# Patient Record
Sex: FEMALE | Race: WHITE | NOT HISPANIC OR LATINO | Employment: OTHER | URBAN - NONMETROPOLITAN AREA
[De-identification: names, ages, dates, MRNs, and addresses within clinical notes are randomized per-mention and may not be internally consistent; named-entity substitution may affect disease eponyms.]

---

## 2018-04-05 ENCOUNTER — OFFICE VISIT CONVERTED (OUTPATIENT)
Dept: FAMILY MEDICINE CLINIC | Age: 73
End: 2018-04-05
Attending: NURSE PRACTITIONER

## 2018-04-18 ENCOUNTER — OFFICE VISIT CONVERTED (OUTPATIENT)
Dept: ORTHOPEDIC SURGERY | Facility: CLINIC | Age: 73
End: 2018-04-18
Attending: ORTHOPAEDIC SURGERY

## 2018-07-23 ENCOUNTER — OFFICE VISIT CONVERTED (OUTPATIENT)
Dept: ORTHOPEDIC SURGERY | Facility: CLINIC | Age: 73
End: 2018-07-23
Attending: PHYSICIAN ASSISTANT

## 2018-08-20 ENCOUNTER — OFFICE VISIT CONVERTED (OUTPATIENT)
Dept: ORTHOPEDIC SURGERY | Facility: CLINIC | Age: 73
End: 2018-08-20
Attending: PHYSICIAN ASSISTANT

## 2018-08-21 ENCOUNTER — OFFICE VISIT CONVERTED (OUTPATIENT)
Dept: FAMILY MEDICINE CLINIC | Age: 73
End: 2018-08-21
Attending: FAMILY MEDICINE

## 2018-10-08 ENCOUNTER — OFFICE VISIT CONVERTED (OUTPATIENT)
Dept: ORTHOPEDIC SURGERY | Facility: CLINIC | Age: 73
End: 2018-10-08
Attending: PHYSICIAN ASSISTANT

## 2019-02-22 ENCOUNTER — OFFICE VISIT CONVERTED (OUTPATIENT)
Dept: FAMILY MEDICINE CLINIC | Age: 74
End: 2019-02-22
Attending: FAMILY MEDICINE

## 2019-02-22 ENCOUNTER — HOSPITAL ENCOUNTER (OUTPATIENT)
Dept: OTHER | Facility: HOSPITAL | Age: 74
Discharge: HOME | End: 2019-02-22
Attending: FAMILY MEDICINE

## 2019-02-22 LAB
ALBUMIN SERPL-MCNC: 3.9 G/DL (ref 3.5–5)
ALBUMIN/GLOB SERPL: 1.2 {RATIO} (ref 1.4–2.6)
ALP SERPL-CCNC: 77 U/L (ref 43–160)
ALT SERPL-CCNC: 10 U/L (ref 10–40)
ANION GAP SERPL CALC-SCNC: 16 MMOL/L (ref 8–19)
AST SERPL-CCNC: 14 U/L (ref 15–50)
BILIRUB SERPL-MCNC: 0.17 MG/DL (ref 0.2–1.3)
BUN SERPL-MCNC: 15 MG/DL (ref 5–25)
BUN/CREAT SERPL: 20 {RATIO} (ref 6–20)
CALCIUM SERPL-MCNC: 9.3 MG/DL (ref 8.7–10.4)
CHLORIDE SERPL-SCNC: 103 MMOL/L (ref 99–111)
CHOLEST SERPL-MCNC: 172 MG/DL (ref 107–200)
CHOLEST/HDLC SERPL: 2.8 {RATIO} (ref 3–6)
CONV CO2: 29 MMOL/L (ref 22–32)
CONV TOTAL PROTEIN: 7.1 G/DL (ref 6.3–8.2)
CREAT UR-MCNC: 0.74 MG/DL (ref 0.5–0.9)
GFR SERPLBLD BASED ON 1.73 SQ M-ARVRAT: >60 ML/MIN/{1.73_M2}
GLOBULIN UR ELPH-MCNC: 3.2 G/DL (ref 2–3.5)
GLUCOSE SERPL-MCNC: 101 MG/DL (ref 65–99)
HDLC SERPL-MCNC: 62 MG/DL (ref 40–60)
LDLC SERPL CALC-MCNC: 77 MG/DL (ref 70–100)
OSMOLALITY SERPL CALC.SUM OF ELEC: 299 MOSM/KG (ref 273–304)
POTASSIUM SERPL-SCNC: 3.5 MMOL/L (ref 3.5–5.3)
SODIUM SERPL-SCNC: 144 MMOL/L (ref 135–147)
TRIGL SERPL-MCNC: 164 MG/DL (ref 40–150)
TSH SERPL-ACNC: 0.66 M[IU]/L (ref 0.27–4.2)
VLDLC SERPL-MCNC: 33 MG/DL (ref 5–37)

## 2019-03-29 ENCOUNTER — HOSPITAL ENCOUNTER (OUTPATIENT)
Dept: URGENT CARE | Facility: CLINIC | Age: 74
Discharge: HOME | End: 2019-03-29
Attending: FAMILY MEDICINE

## 2019-04-01 LAB — BACTERIA SPEC AEROBE CULT: NORMAL

## 2019-06-18 ENCOUNTER — HOSPITAL ENCOUNTER (OUTPATIENT)
Dept: OTHER | Facility: HOSPITAL | Age: 74
Discharge: HOME | End: 2019-06-18
Attending: FAMILY MEDICINE

## 2019-08-29 ENCOUNTER — OFFICE VISIT CONVERTED (OUTPATIENT)
Dept: FAMILY MEDICINE CLINIC | Age: 74
End: 2019-08-29
Attending: FAMILY MEDICINE

## 2019-09-03 ENCOUNTER — HOSPITAL ENCOUNTER (OUTPATIENT)
Dept: GENERAL RADIOLOGY | Facility: HOSPITAL | Age: 74
Discharge: HOME | End: 2019-09-03
Attending: FAMILY MEDICINE

## 2019-09-03 LAB
ALBUMIN SERPL-MCNC: 4.2 G/DL (ref 3.5–5)
ALBUMIN/GLOB SERPL: 1.4 {RATIO} (ref 1.4–2.6)
ALP SERPL-CCNC: 76 U/L (ref 43–160)
ALT SERPL-CCNC: 10 U/L (ref 10–40)
ANION GAP SERPL CALC-SCNC: 18 MMOL/L (ref 8–19)
AST SERPL-CCNC: 16 U/L (ref 15–50)
BILIRUB SERPL-MCNC: 0.49 MG/DL (ref 0.2–1.3)
BUN SERPL-MCNC: 15 MG/DL (ref 5–25)
BUN/CREAT SERPL: 18 {RATIO} (ref 6–20)
CALCIUM SERPL-MCNC: 9.8 MG/DL (ref 8.7–10.4)
CHLORIDE SERPL-SCNC: 104 MMOL/L (ref 99–111)
CHOLEST SERPL-MCNC: 170 MG/DL (ref 107–200)
CHOLEST/HDLC SERPL: 2.6 {RATIO} (ref 3–6)
CONV CO2: 25 MMOL/L (ref 22–32)
CONV TOTAL PROTEIN: 7.2 G/DL (ref 6.3–8.2)
CREAT UR-MCNC: 0.82 MG/DL (ref 0.5–0.9)
GFR SERPLBLD BASED ON 1.73 SQ M-ARVRAT: >60 ML/MIN/{1.73_M2}
GLOBULIN UR ELPH-MCNC: 3 G/DL (ref 2–3.5)
GLUCOSE SERPL-MCNC: 98 MG/DL (ref 65–99)
HDLC SERPL-MCNC: 66 MG/DL (ref 40–60)
LDLC SERPL CALC-MCNC: 85 MG/DL (ref 70–100)
OSMOLALITY SERPL CALC.SUM OF ELEC: 297 MOSM/KG (ref 273–304)
POTASSIUM SERPL-SCNC: 3.8 MMOL/L (ref 3.5–5.3)
SODIUM SERPL-SCNC: 143 MMOL/L (ref 135–147)
TRIGL SERPL-MCNC: 94 MG/DL (ref 40–150)
TSH SERPL-ACNC: 1.05 M[IU]/L (ref 0.27–4.2)
VLDLC SERPL-MCNC: 19 MG/DL (ref 5–37)

## 2019-09-13 ENCOUNTER — OFFICE VISIT CONVERTED (OUTPATIENT)
Dept: SURGERY | Facility: CLINIC | Age: 74
End: 2019-09-13
Attending: NURSE PRACTITIONER

## 2019-09-13 ENCOUNTER — CONVERSION ENCOUNTER (OUTPATIENT)
Dept: SURGERY | Facility: CLINIC | Age: 74
End: 2019-09-13

## 2019-09-16 ENCOUNTER — HOSPITAL ENCOUNTER (OUTPATIENT)
Dept: GENERAL RADIOLOGY | Facility: HOSPITAL | Age: 74
Discharge: HOME | End: 2019-09-16
Attending: NURSE PRACTITIONER

## 2019-09-23 ENCOUNTER — HOSPITAL ENCOUNTER (OUTPATIENT)
Dept: GENERAL RADIOLOGY | Facility: HOSPITAL | Age: 74
Discharge: HOME | End: 2019-09-23
Attending: NURSE PRACTITIONER

## 2019-09-30 ENCOUNTER — HOSPITAL ENCOUNTER (OUTPATIENT)
Dept: GENERAL RADIOLOGY | Facility: HOSPITAL | Age: 74
Discharge: HOME | End: 2019-09-30
Attending: NURSE PRACTITIONER

## 2020-02-28 ENCOUNTER — OFFICE VISIT CONVERTED (OUTPATIENT)
Dept: FAMILY MEDICINE CLINIC | Age: 75
End: 2020-02-28
Attending: FAMILY MEDICINE

## 2020-03-06 ENCOUNTER — HOSPITAL ENCOUNTER (OUTPATIENT)
Dept: GENERAL RADIOLOGY | Facility: HOSPITAL | Age: 75
Discharge: HOME | End: 2020-03-06
Attending: FAMILY MEDICINE

## 2020-03-06 LAB
ALBUMIN SERPL-MCNC: 4.1 G/DL (ref 3.5–5)
ALBUMIN/GLOB SERPL: 1.3 {RATIO} (ref 1.4–2.6)
ALP SERPL-CCNC: 76 U/L (ref 43–160)
ALT SERPL-CCNC: 12 U/L (ref 10–40)
ANION GAP SERPL CALC-SCNC: 16 MMOL/L (ref 8–19)
AST SERPL-CCNC: 18 U/L (ref 15–50)
BILIRUB SERPL-MCNC: 0.35 MG/DL (ref 0.2–1.3)
BUN SERPL-MCNC: 15 MG/DL (ref 5–25)
BUN/CREAT SERPL: 18 {RATIO} (ref 6–20)
CALCIUM SERPL-MCNC: 9.5 MG/DL (ref 8.7–10.4)
CHLORIDE SERPL-SCNC: 103 MMOL/L (ref 99–111)
CONV CO2: 23 MMOL/L (ref 22–32)
CONV TOTAL PROTEIN: 7.2 G/DL (ref 6.3–8.2)
CREAT UR-MCNC: 0.84 MG/DL (ref 0.5–0.9)
GFR SERPLBLD BASED ON 1.73 SQ M-ARVRAT: >60 ML/MIN/{1.73_M2}
GLOBULIN UR ELPH-MCNC: 3.1 G/DL (ref 2–3.5)
GLUCOSE SERPL-MCNC: 101 MG/DL (ref 65–99)
OSMOLALITY SERPL CALC.SUM OF ELEC: 287 MOSM/KG (ref 273–304)
POTASSIUM SERPL-SCNC: 3.8 MMOL/L (ref 3.5–5.3)
SODIUM SERPL-SCNC: 138 MMOL/L (ref 135–147)
TSH SERPL-ACNC: 2.5 M[IU]/L (ref 0.27–4.2)

## 2020-03-09 ENCOUNTER — HOSPITAL ENCOUNTER (OUTPATIENT)
Dept: ULTRASOUND IMAGING | Facility: HOSPITAL | Age: 75
Discharge: HOME | End: 2020-03-09
Attending: FAMILY MEDICINE

## 2020-03-13 ENCOUNTER — HOSPITAL ENCOUNTER (OUTPATIENT)
Dept: CT IMAGING | Facility: HOSPITAL | Age: 75
Discharge: HOME | End: 2020-03-13
Attending: FAMILY MEDICINE

## 2020-09-01 ENCOUNTER — OFFICE VISIT CONVERTED (OUTPATIENT)
Dept: FAMILY MEDICINE CLINIC | Age: 75
End: 2020-09-01
Attending: FAMILY MEDICINE

## 2020-09-09 ENCOUNTER — HOSPITAL ENCOUNTER (OUTPATIENT)
Dept: GENERAL RADIOLOGY | Facility: HOSPITAL | Age: 75
Discharge: HOME | End: 2020-09-09
Attending: FAMILY MEDICINE

## 2020-09-09 LAB
ALBUMIN SERPL-MCNC: 4 G/DL (ref 3.5–5)
ALBUMIN/GLOB SERPL: 1.4 {RATIO} (ref 1.4–2.6)
ALP SERPL-CCNC: 82 U/L (ref 43–160)
ALT SERPL-CCNC: 11 U/L (ref 10–40)
ANION GAP SERPL CALC-SCNC: 17 MMOL/L (ref 8–19)
AST SERPL-CCNC: 19 U/L (ref 15–50)
BILIRUB SERPL-MCNC: 0.37 MG/DL (ref 0.2–1.3)
BUN SERPL-MCNC: 13 MG/DL (ref 5–25)
BUN/CREAT SERPL: 17 {RATIO} (ref 6–20)
CALCIUM SERPL-MCNC: 9.6 MG/DL (ref 8.7–10.4)
CHLORIDE SERPL-SCNC: 104 MMOL/L (ref 99–111)
CONV CO2: 24 MMOL/L (ref 22–32)
CONV TOTAL PROTEIN: 6.9 G/DL (ref 6.3–8.2)
CREAT UR-MCNC: 0.77 MG/DL (ref 0.5–0.9)
GFR SERPLBLD BASED ON 1.73 SQ M-ARVRAT: >60 ML/MIN/{1.73_M2}
GLOBULIN UR ELPH-MCNC: 2.9 G/DL (ref 2–3.5)
GLUCOSE SERPL-MCNC: 99 MG/DL (ref 65–99)
OSMOLALITY SERPL CALC.SUM OF ELEC: 292 MOSM/KG (ref 273–304)
POTASSIUM SERPL-SCNC: 3.8 MMOL/L (ref 3.5–5.3)
SODIUM SERPL-SCNC: 141 MMOL/L (ref 135–147)
TSH SERPL-ACNC: 1.59 M[IU]/L (ref 0.27–4.2)

## 2021-05-15 VITALS — BODY MASS INDEX: 26.41 KG/M2 | WEIGHT: 134.5 LBS | HEIGHT: 60 IN | RESPIRATION RATE: 14 BRPM

## 2021-05-16 VITALS — HEART RATE: 37 BPM | OXYGEN SATURATION: 94 % | HEIGHT: 61 IN | BODY MASS INDEX: 25.32 KG/M2 | WEIGHT: 134.12 LBS

## 2021-05-16 VITALS — OXYGEN SATURATION: 95 % | HEART RATE: 77 BPM | WEIGHT: 132 LBS | BODY MASS INDEX: 24.92 KG/M2 | HEIGHT: 61 IN

## 2021-05-16 VITALS — BODY MASS INDEX: 25.91 KG/M2 | WEIGHT: 132 LBS | HEIGHT: 60 IN | OXYGEN SATURATION: 94 % | HEART RATE: 74 BPM

## 2021-05-18 ENCOUNTER — OFFICE VISIT CONVERTED (OUTPATIENT)
Dept: FAMILY MEDICINE CLINIC | Age: 76
End: 2021-05-18
Attending: FAMILY MEDICINE

## 2021-05-18 NOTE — PROGRESS NOTES
Lena White 1945     Office/Outpatient Visit    Visit Date: Thu, Aug 29, 2019 01:46 pm    Provider: Chery Wright MD (Assistant: Shreya Naylor MA)    Location: [facility]        Electronically signed by Chery Wright MD on  08/29/2019 02:36:42 PM                             SUBJECTIVE:        CC:     Ms. White is a 73 year old White female.  Patient presents today for [facility] visit;         HPI:     She is due for colonoscopy, last done 2009 and this was reportedly normal.  Pap smear is no longer indicated by age and history; s/p hyst.  She is UTD on mammogram, last done 6/2019 and this was normal.  She is UTD on DEXA, last done 8/2018 and this showed osteoporosis.  She is on Fosamax.  She is UTD on Pneumovax (11/2016), Prevnar (11/2015), Shingrix #1 (10/2018), and flu (10/2018).  She is due for Shingrix #2, Havrix, and Td.  She is due for routine labs including HTN panel and TSH.     Ms. White is here for a Medicare wellness visit.  ADVANCE DIRECTIVES (Please update in PMH): Living will Returning to health checkup, the required HRA questions are integrated within this visit note. Family medical history and individual medical/surgical history were reviewed and updated.  A current height, weight, BMI, blood pressure, and pulse were recorded in the vitals section of the note and have been reviewed. Patient's medications, including supplements, were recorded in the chart and reviewed.  Current providers and suppliers were reviewed and updated.          Self-Assessment of Health: She rates her health as very good. She rates her confidence of being able to control/manage most of her health problems as very confident. Her physical/emotional health has limited her social activites not at all.  A review of possible cognitive impairment was performed and the following was noted: she is not having trouble driving;  memory changes are noted;  she is not having trouble with her finances A review of  functional ability, including bathing, dressing, walking, and urine/bowel continence as well as level of safety was performed and was found to be negative.  Falls Risk: Has not had any falls or only one fall without injury in the past year.  In regard to hearing, she reports having to strain to hear or understand conversations, but not having trouble hearing the TV/radio when others do not or wearing hearing aid(s).  Concerning home safety, she reports that at home she DOES have adequate lighting, a skid resistant shower/tub, grab bars in the bath, handrails on stairs, functioning smoke alarms and absence of throw rugs.          Immunization Status: Up to date; Physical Activity: She exercises for at least 20 minutes 3 or more days/week.; Type of diet patient normally eats is described as well-balanced with fruits and vegetables Tobacco: She has never smoked.  Preventative Health updated today         PHQ-9 Depression Screening: Completed form scanned and in chart; Total Score 11 Alcohol Consumption Screening: Completed form scanned and in chart; Total Score 0     ROS:     CONSTITUTIONAL:  Negative for fatigue and fever.      EYES:  Negative for blurred vision.      E/N/T:  Negative for diminished hearing and nasal congestion.      CARDIOVASCULAR:  Negative for chest pain and palpitations.      RESPIRATORY:  Negative for recent cough and dyspnea.      GASTROINTESTINAL:  Negative for abdominal pain, constipation, diarrhea, nausea, vomiting and change in stool caliber.      GENITOURINARY:  Negative for dysuria and urinary incontinence.      MUSCULOSKELETAL:  Positive for arthralgias and bilateral wrist pain.   Negative for myalgias.      NEUROLOGICAL:  Negative for paresthesias and weakness.      PSYCHIATRIC:  Negative for anxiety, depression and sleep disturbance.          PMH/FMH/SH:     Last Reviewed on 8/29/2019 02:35 PM by Chery Wright    Past Medical History:             PAST MEDICAL HISTORY         Positive  for    Hyperlipidemia and    Hypertension;     Positive for    Hypothyroidism;     Positive for    Skin cancer: Squamous  and Basil cell; ;         CURRENT MEDICAL PROVIDERS:    Dermatologist: Dr. Padilla    General Surgeon: Dr. Coker             ADVANCED DIRECTIVES: None         PREVENTIVE HEALTH MAINTENANCE             BONE DENSITY: was last done 8/7/2018 with the following abnormality noted-- Osteoporosis (unchanged from 2015) on alendronate     COLORECTAL CANCER SCREENING: Up to date (colonoscopy q10y; sigmoidoscopy q5y; Cologuard q3y) was last done 1/09; colonoscopy with normal results     MAMMOGRAM: Done within last 2 years and results in are chart was last done 6/25/2019 with normal results     PAP SMEAR: but the results are unknown No longer indicated due to age and history s/p hyst         PAST MEDICAL HISTORY             ADVANCE DIRECTIVE Living will she just keeps it at home         PAST MEDICAL HISTORY             ADVANCE DIRECTIVE Living will copy requested ./mlb         Surgical History:         Cholecystectomy    Hernia Repair: ventral hernia repair 11/2016;     Hysterectomy    Joint Replacement: 7/2018 - bilateral knee replacement;     Tonsillectomy      Splenectomy      Hernia Repair: incisional; x2;     Exploratory Laparotomy: for hemoperitoneum;         Family History:     Father: Lung Cancer     Mother: Sarcoidosis         Social History:     Occupation:    Retired     Marital Status: Single         Tobacco/Alcohol/Supplements:     Last Reviewed on 8/29/2019 02:35 PM by Chery Wright    Tobacco: She has never smoked.          Substance Abuse History:     Last Reviewed on 8/29/2019 02:35 PM by Chery Wright        Mental Health History:     Last Reviewed on 8/29/2019 02:35 PM by Chery Wright        Communicable Diseases (eg STDs):     Last Reviewed on 8/29/2019 02:35 PM by Chery Wright            Current Problems:     Last Reviewed on 2/22/2019 01:37 PM by Chery Wright     Osteoporosis, other     Squamous cell carcinoma of skin     Actinic keratosis     Anxiety     History of splenectomy     Depression     Hypothyroidism     Hyperlipidemia     Essential hypertension     Hand pain         Immunizations:     zzPrevnar-13 11/4/2015     Fluzone (3 + years dose) 10/18/2018     Fluad pf 10/4/2017     Fluzone High-Dose pf (>=65 yr) 11/4/2015     Fluzone High-Dose pf (>=65 yr) 9/22/2016     PNEUMOVAX 23 (Pneumococcal PPV23) 11/22/2016     Shingrix (Zoster recombinant) 10/18/2018         Allergies:     Last Reviewed on 2/22/2019 01:37 PM by Chery Wrightien:    Hyaluronic Acid:    Methylprednisolone:        Current Medications:     Last Reviewed on 2/22/2019 01:37 PM by Chery Wright    Pravastatin 20mg Tablet 1 tab q day hs     Lisinopril/Hydrochlorothiazide 10mg/12.5mg Tablet one a day     Synthroid 0.088mg Tablet Take 1 tablet(s) by mouth daily     Trazodone HCl 100mg Tablet 1/2 to 1 tab HS prn     Venlafaxine HCl 150mg Capsules, Extended Release Take 1 capsule(s) by mouth daily     Venlafaxine HCl 75mg Capsules, Extended Release 1 cap daily     Alendronate Sodium 70mg Tablet Take 1 tablet(s) by mouth q week     Aspirin (ASA) 325mg Caplet Take one tablet once daily for 30 days     OTC Calcium 600mg w/ Vitamin D3 800IU Take one tablet once daily     PreserVision Areds 2 2 tabs qd         OBJECTIVE:        Vitals:         Current: 8/29/2019 1:51:02 PM    Ht:  5 ft, 0.75 in;  Wt: 134.2 lbs;  BMI: 25.6    T: 98.1 F (oral);  BP: 111/75 mm Hg (left arm, sitting);  P: 69 bpm (left arm (BP Cuff), sitting);  sCr: 0.74 mg/dL;  GFR: 60.35    VA: 20/20 OD, 20/20 OS (near, with correction)        Exams:     PHYSICAL EXAM:     GENERAL: vital signs recorded - well developed, well nourished;  well groomed;  no apparent distress;     EYES: extraocular movements intact; conjunctiva and cornea are normal; PERRLA;     E/N/T: OROPHARYNX:  normal mucosa, dentition, gingiva, and posterior pharynx;      RESPIRATORY: normal respiratory rate and pattern with no distress; normal breath sounds with no rales, rhonchi, wheezes or rubs;     CARDIOVASCULAR: normal rate; rhythm is regular;  no systolic murmur; no edema;     GASTROINTESTINAL: nontender; normal bowel sounds;     MUSCULOSKELETAL: normal gait; normal overall tone     NEUROLOGIC: mental status: alert and oriented x 3; Reflexes: brachioradialis: 2+; knee jerks: 2+;     PSYCHIATRIC: appropriate affect and demeanor; normal psychomotor function; normal speech pattern;         ASSESSMENT           V70.0   Z00.00  Health checkup              DDx:     V79.0   Z13.89  Screening for depression              DDx:     401   I10  Essential hypertension              DDx:     244.9   E03.9  Hypothyroidism              DDx:     715.14   M19.041   M19.042  Osteoarthritis of hand(s)              DDx:     V76.51   Z12.11  Screening for cancer of colon              DDx:         ORDERS:         Meds Prescribed:       Refill of: Pravastatin 20mg Tablet 1 tab q day hs  #90 (Ninety) tablet(s) Refills: 1       Refill of: Lisinopril/Hydrochlorothiazide 10mg/12.5mg Tablet one a day  #90 (Ninety) tablet(s) Refills: 1       Refill of: Synthroid (Levothyroxine Sodium) 0.088mg Tablet Take 1 tablet(s) by mouth daily  #90 (Ninety) tablet(s) Refills: 1       Refill of: Trazodone HCl 100mg Tablet 1/2 to 1 tab HS prn  #45 (Forty Five) tablet(s) Refills: 1       Refill of: Venlafaxine HCl 150mg Capsules, Extended Release Take 1 capsule(s) by mouth daily  #90 (Ninety) capsule(s) Refills: 1       Refill of: Venlafaxine HCl 75mg Capsules, Extended Release 1 cap daily  #90 (Ninety) capsule(s) Refills: 1       Refill of: Alendronate Sodium 70mg Tablet Take 1 tablet(s) by mouth q week  #12 (Twelve) tablet(s) Refills: 3       Voltaren  (Diclofenac Sodium) 1% Topical Gel Apply to affected area BID  #100 (One [place]) gm Refills: 2         Radiology/Test Orders:       3014F  Screening mammography results  documented and reviewed (PV)1  (In-House)           Lab Orders:       [id number]  TSH - [facility] TSH  (Send-Out)         [id number]  HTNLP - [facility] CMP AND LIPID: [id number]  (Send-Out)         APPTO  Appointment need  (In-House)           Procedures Ordered:         Annual wellness visit, includes a PPPS, subsequent visit  (In-House)         REFER  Referral to Specialist or Other Facility  (Send-Out)           Other Orders:         Depression screen positive and follow up plan documented  (In-House)         1101F  Pt screen for fall risk; document no falls in past year or only 1 fall w/o injury in past year (ALMA)  (In-House)           Negative EtOH screen  (In-House)                   PLAN:          Health checkup She is due for colonoscopy, last done 2009 and this was reportedly normal; ordered.  Pap smear is no longer indicated by age and history; s/p hyst.  She is UTD on mammogram, last done 6/2019 and this was normal.  She is UTD on DEXA, last done 8/2018 and this showed osteoporosis.  She is on Fosamax.  She is UTD on Pneumovax (11/2016), Prevnar (11/2015), Shingrix #1 (10/2018), and flu (10/2018).  She is due for Shingrix #2, Havrix, and Td; can be done at pharmacy or Health Dept (Td).  She is due for routine labs including HTN panel and TSH; ordered.  No fall risk, no memory issues, no signs/symptoms of depression.  She lives alone.  She is able to drive and perform ADLs/manage finances independently.  Hearing is adequate.  She has a living will and preventive services handout and safety handout were given to her.  Current doctor list updated.  RTC 6 months.     MIPS PHQ-9 Depression Screening: Completed form scanned and in chart; Total Score 11 Positive Depression Screen: Stable on medications. No suicidal ideation.  Negative alcohol screen     FOLLOW-UP: Schedule a follow-up visit in 6 months..  f/u HTN with [place]           Orders:         Annual wellness visit, includes a PPPS, subsequent visit   (In-House)           Depression screen positive and follow up plan documented  (In-House)         1101F  Pt screen for fall risk; document no falls in past year or only 1 fall w/o injury in past year (ALMA)  (In-House)           Negative EtOH screen  (In-House)         3014F  Screening mammography results documented and reviewed (PV)1  (In-House)         APPTO  Appointment need  (In-House)            Essential hypertension     LABORATORY:  Labs ordered to be performed today include HTN/Lipid Panel: CMP, Lipid.            Prescriptions:       Refill of: Lisinopril/Hydrochlorothiazide 10mg/12.5mg Tablet one a day  #90 (Ninety) tablet(s) Refills: 1           Orders:       [id number]  HTN - [facility] CMP AND LIPID: [id number]  (Send-Out)            Hypothyroidism     LABORATORY:  Labs ordered to be performed today include TSH.            Prescriptions:       Refill of: Synthroid (Levothyroxine Sodium) 0.088mg Tablet Take 1 tablet(s) by mouth daily  #90 (Ninety) tablet(s) Refills: 1           Orders:       [id number]  TSH [facility] TSH  (Send-Out)            Osteoarthritis of hand(s)           Prescriptions:       Voltaren  (Diclofenac Sodium) 1% Topical Gel Apply to affected area BID  #100 (One [place]) gm Refills: 2          Screening for cancer of colon         REFERRALS:  Referral initiated to a general surgeon ( Dr. Chun Coker; a colonoscopy ).            Orders:       REFER  Referral to Specialist or Other Facility  (Send-Out)               Other Prescriptions:       Refill of: Pravastatin 20mg Tablet 1 tab q day hs  #90 (Ninety) tablet(s) Refills: 1       Refill of: Trazodone HCl 100mg Tablet 1/2 to 1 tab HS prn  #45 (Forty Five) tablet(s) Refills: 1       Refill of: Venlafaxine HCl 150mg Capsules, Extended Release Take 1 capsule(s) by mouth daily  #90 (Ninety) capsule(s) Refills: 1       Refill of: Venlafaxine HCl 75mg Capsules, Extended Release 1 cap daily  #90 (Ninety) capsule(s) Refills: 1       Refill of:  Alendronate Sodium 70mg Tablet Take 1 tablet(s) by mouth q week  #12 (Twelve) tablet(s) Refills: 3         Patient Recommendations:        For  Health checkup:     Schedule a follow-up visit in 6 months.                APPOINTMENT INFORMATION:        Monday Tuesday Wednesday Thursday Friday Saturday Sunday            Time:___________________AM  PM   Date:_____________________             CHARGE CAPTURE           **Please note: ICD descriptions below are intended for billing purposes only and may not represent clinical diagnoses**        Primary Diagnosis:         V70.0 Health checkup            Z00.00    Encounter for general adult medical examination without abnormal findings              Orders:             Annual wellness visit, includes a PPPS, subsequent visit  (In-House)                Depression screen positive and follow up plan documented  (In-House)             1101F   Pt screen for fall risk; document no falls in past year or only 1 fall w/o injury in past year (ALMA)  (In-House)                Negative EtOH screen  (In-House)             3014F   Screening mammography results documented and reviewed (PV)1  (In-House)             APPTO   Appointment need  (In-House)           V79.0 Screening for depression            Z13.89    Encounter for screening for other disorder    401 Essential hypertension            I10    Essential (primary) hypertension    244.9 Hypothyroidism            E03.9    Hypothyroidism, unspecified    715.14 Osteoarthritis of hand(s)            M19.041    Primary osteoarthritis, right hand           M19.042    Primary osteoarthritis, left hand    V76.51 Screening for cancer of colon            Z12.11    Encounter for screening for malignant neoplasm of colon

## 2021-05-18 NOTE — PROGRESS NOTES
"Lena White A  1945     Office/Outpatient Visit    Visit Date: Fri, Feb 28, 2020 02:58 pm    Provider: Chery Wright MD (Assistant: Shreya Naylor MA)    Location: [facility]        Electronically signed by Chery Wright MD on  02/28/2020 05:08:54 PM                             Subjective:        CC: Ms. White is a 74 year old White female.  Patient presents today for six month follow up (not taking Aspirin or Pravastatin);         HPI: Lena is here today to f/u on chronic issues.        She has noticed a painful bulge in the LUQ.  It is tender and painful all the time, but she has severe pain when it bulges out.  So far, she has been able to reduce it every time.  She has had recurrent ventral hernias in the past.  She has had a total of 5 ventral hernia repairs.        She is on lisinopril/HCTZ for HTN.  BP has been well controlled.  No CP, palpitations, SOB.  She is no longer taking pravastatin for HLD.  She has also stopped ASA.        She is on Synthroid for hypothyroidism.  No heat/cold intolerance, no changes in hair or skin.        She is on venlafaxine for depression.  She is on trazodone for sleep.  Mood has been \"good.\"  No depressed mood or anhedonia.  No anxiety or panic attacks.        She is on alendronate for osetoporosis.  Last DEXA was 8/2018.    ROS:     CONSTITUTIONAL:  Negative for fatigue and fever.      EYES:  Negative for blurred vision.      CARDIOVASCULAR:  Negative for chest pain and palpitations.      RESPIRATORY:  Negative for recent cough and dyspnea.      GASTROINTESTINAL:  Negative for abdominal pain, constipation, diarrhea, nausea, vomiting and change in stool caliber.      MUSCULOSKELETAL:  Positive for arthralgias and bilateral wrist pain.   Negative for myalgias.      PSYCHIATRIC:  Negative for anxiety, depression and sleep disturbance.          Past Medical History / Family History / Social History:         Last Reviewed on 2/28/2020 03:10 PM by " Chery Wright    Past Medical History:             PAST MEDICAL HISTORY         Positive for    Hyperlipidemia and    Hypertension;     Positive for    Hypothyroidism;     Positive for    Skin cancer: Squamous  and Basil cell; ;         CURRENT MEDICAL PROVIDERS:    Dermatologist: Dr. Padilla    General Surgeon: Dr. Coker             ADVANCED DIRECTIVES: None         PREVENTIVE HEALTH MAINTENANCE             BONE DENSITY: was last done 8/7/2018 with the following abnormality noted-- Osteoporosis (unchanged from 2015) on alendronate     COLORECTAL CANCER SCREENING: Up to date (colonoscopy q10y; sigmoidoscopy q5y; Cologuard q3y) was last done 1/09; colonoscopy with normal results     MAMMOGRAM: Done within last 2 years and results in are chart was last done 6/25/2019 with normal results     PAP SMEAR: but the results are unknown No longer indicated due to age and history s/p hyst         PAST MEDICAL HISTORY             ADVANCE DIRECTIVE Living will she just keeps it at home         PAST MEDICAL HISTORY             ADVANCE DIRECTIVE Living will copy requested ./mlb         Surgical History:         Cholecystectomy    Hernia Repair: ventral hernia repair 11/2016;     Hysterectomy    Joint Replacement: 7/2018 - bilateral knee replacement;     Tonsillectomy     Splenectomy     Hernia Repair: incisional; x2;     Exploratory Laparotomy: for hemoperitoneum;         Family History:     Father: Lung Cancer     Mother: Sarcoidosis         Social History:     Occupation:    Retired     Marital Status: Single         Tobacco/Alcohol/Supplements:     Last Reviewed on 2/28/2020 03:10 PM by Chery Wright    Tobacco: She has never smoked.          Substance Abuse History:     Last Reviewed on 2/28/2020 03:10 PM by Chery Wright        Mental Health History:     Last Reviewed on 2/28/2020 03:10 PM by Chery Wright        Communicable Diseases (eg STDs):     Last Reviewed on 2/28/2020 03:10 PM by Chery Wright         Current Problems:     Last Reviewed on 8/29/2019 02:35 PM by Chery Wright    Essential hypertension    Hypothyroidism, unspecified    Mixed hyperlipidemia    Major depressive disorder, single episode, mild    Essential (primary) hypertension    Pain in left hand    Acquired absence of spleen    History of splenectomy    Hypothyroidism    Hyperlipidemia    Hand pain    Depression    Other specified malignant neoplasm of skin, unspecified    Anxiety    Squamous cell carcinoma of skin    Actinic keratosis    Actinic keratosis    Osteoporosis, other    Other osteoporosis without current pathological fracture    Encounter for screening for malignant neoplasm of colon    Osteoarthritis of hand(s)    Screening for cancer of colon    Primary osteoarthritis, right hand    Primary osteoarthritis, left hand        Immunizations:     Prevnar 13 (Pneumococcal PCV 13) 9/29/2019    zzPrevnar-13 11/4/2015    Fluzone (3 + years dose) 10/18/2018    Fluad pf 10/4/2017    Fluzone High-Dose pf (>=65 yr) 11/4/2015    Fluzone High-Dose pf (>=65 yr) 9/22/2016    Influenza High-Dose Virus Vaccine pf (>=65 yr) 9/29/2019    PNEUMOVAX 23 (Pneumococcal PPV23) 11/22/2016    Shingrix (Zoster recombinant) 10/18/2018        Allergies:     Last Reviewed on 8/29/2019 02:35 PM by Chery Wright    Ambien:      Hyaluronic Acid:      Methylprednisolone:          Current Medications:     Last Reviewed on 8/29/2019 02:35 PM by Chery Wright    PreserVision Areds 2 2 tabs qd     lisinopriL-hydrochlorothiazide 10-12.5 mg oral tablet [one a day]    Pravastatin 20 mg oral tablet [1 tab q day hs]    venlafaxine 150 mg oral Capsule, Extended Release 24 hr [Take 1 capsule(s) by mouth daily]    venlafaxine 75 mg oral Capsule, Extended Release 24 hr [1 cap daily]    alendronate 70 mg oral tablet [Take 1 tablet(s) by mouth q week]    traZODone 100 mg oral tablet [TAKE 1/2-1 TABLET BY MOUTH ONCE DAILY AT BEDTIME IF NEEDED]    Synthroid 0.088mg Tablet [Take 1  tablet(s) by mouth daily]    OTC Calcium 600mg w/ Vitamin D3 800IU Take one tablet once daily      aspirin 325 mg oral tablet [Take one tablet once daily for 30 days]    Voltaren  1% Topical Gel [Apply to affected area BID]        Objective:        Vitals:         Current: 2/28/2020 3:02:38 PM    Ht:  5 ft, 0.75 in;  Wt: 140.4 lbs;  BMI: 26.7T: 98.4 F (oral);  BP: 144/75 mm Hg (left arm, sitting);  P: 79 bpm (left arm (BP Cuff), sitting);  sCr: 0.82 mg/dL;  GFR: 54.70        Repeat:     3:31:18 PM  BP:   104/74mm Hg (left arm, sitting, HR: 81)     Exams:     PHYSICAL EXAM:     GENERAL: vital signs recorded - well developed, well nourished;  well groomed;  no apparent distress;     EYES: extraocular movements intact; conjunctiva and cornea are normal; PERRLA;     E/N/T: OROPHARYNX:  normal mucosa, dentition, gingiva, and posterior pharynx;     RESPIRATORY: normal respiratory rate and pattern with no distress; normal breath sounds with no rales, rhonchi, wheezes or rubs;     CARDIOVASCULAR: normal rate; rhythm is regular;  no systolic murmur; no edema;     GASTROINTESTINAL: mild LUQ tenderness;  no guarding;  no rebound tenderness;  normal bowel sounds;     MUSCULOSKELETAL: normal gait; normal overall tone     PSYCHIATRIC: appropriate affect and demeanor; normal psychomotor function; normal speech pattern;         Assessment:         I10   HTN - Essential (primary) hypertension       K43.9   Ventral hernia without obstruction or gangrene       E03.9   Hypothyroidism, unspecified       F32.0   Depression - Major depressive disorder, single episode, mild       Z13.31   Encounter for screening for depression           ORDERS:         Lab Orders:       [id number]  COMP - [facility] Comp. Metabolic Panel  (Send-Out)            51327  TSH - [facility] TSH  (Send-Out)            APPTO  Appointment need  (In-House)              Other Orders:         Screening mammogram results documented  (Send-Out)            1124F  Advance Care Planning  discussed and doc in MR; no surrogate named or advance care plan provided  (Send-Out)            62156  CT Abdomen and Pelvis with IV Contrast; prefer oral prep  (Send-Out)              Depression screen negative  (In-House)            1101F  Pt screen for fall risk; document no falls in past year or only 1 fall w/o injury in past year (ALMA)  (In-House)                      Plan:         HTN - Essential (primary) hypertensionBP at goal.  No refills needed.  Checking labs.  RTC 6 months for AWV.        No falls in the past year.    MIPS PHQ-9 Depression Screening: Completed form scanned and in chart; Total Score 4; Negative Depression Screen     FOLLOW-UP: Schedule a follow-up visit in 6 months.:.  Medicare wellness 30 min with [place]          Orders:         Screening mammogram results documented  (Send-Out)            1124F  Advance Care Planning discussed and doc in MR; no surrogate named or advance care plan provided  (Send-Out)            APPTO  Appointment need  (In-House)              Depression screen negative  (In-House)            1101F  Pt screen for fall risk; document no falls in past year or only 1 fall w/o injury in past year (ALMA)  (In-House)              Ventral hernia without obstruction or gangreneRecurrent ventral hernia.  Dr. Coker repaired the three that have been done here in [place].  Obtaining CT A/P with contrast and PO prep and will refer back to Dr. Coker once results come back.        RADIOLOGY:  I have ordered Test to be ordered CT of CT abdomen and pelvis with contrast; oral prep to be done today.  SHE WOULD LIKE TO DO IT IN [place] IF POSSIBLE OR [facility] IF NOT IN [place]          Orders:       [id number]  CT Abdomen and Pelvis with IV Contrast; prefer oral prep  (Send-Out)              Hypothyroidism, unspecifiedStable.  No refills needed.  Checking labs.    LABORATORY:  Labs ordered to be performed today include Comprehensive metabolic panel and TSH.             Orders:       [id number]  COMP - [facility] Comp. Metabolic Panel  (Send-Out)            45602  TSH - [facility] TSH  (Send-Out)              Depression - Major depressive disorder, single episode, mildStable.  No refills needed.            Patient Recommendations:        For  HTN - Essential (primary) hypertension:    Schedule a follow-up visit in 6 months.                APPOINTMENT INFORMATION:        Monday Tuesday Wednesday Thursday Friday Saturday Sunday            Time:___________________AM  PM   Date:_____________________             Charge Capture:         Primary Diagnosis:     I10  HTN - Essential (primary) hypertension           Orders:      [id number]  Office/outpatient visit; established patient, level 4  (In-House)            APPTO  Appointment need  (In-House)              Depression screen negative  (In-House)            1101F  Pt screen for fall risk; document no falls in past year or only 1 fall w/o injury in past year (ALMA)  (In-House)              K43.9  Ventral hernia without obstruction or gangrene     E03.9  Hypothyroidism, unspecified     F32.0  Depression - Major depressive disorder, single episode, mild     Z13.31  Encounter for screening for depression

## 2021-05-18 NOTE — PROGRESS NOTES
Lena White  1945     Office/Outpatient Visit    Visit Date: Tue, Sep 1, 2020 10:18 am    Provider: Chery Wright MD (Assistant: Shreya Naylor MA)    Location: [facility]        Electronically signed by Chery Wright MD on  09/01/2020 11:21:03 AM                             Subjective:        CC: Ms. White is a 74 year old White female.  Patient presents today for [facility] visit;         HPI:       She is reportedly UTD on colon cancer screening; reports Dr. Coker recommended against colonoscopy when she saw him last year and instead ordered hemoccult screening.  Pap smear is no longer indicated by age and history.  She is due for mammogram, last done 6/2019 and this was normal.  She is due for DEXA, last done 8/2018 and this showed osteoporosis.  Currently on Fosamax.  She is UTD on Pneumovax (11/2016), Prevnar (11/2015), Zostavax (8/2017), Shingrix (2018), and flu (9/2019).  She is due for Td.  She is due for routine labs including CMP and TSH.      Ms. White is here for a Medicare wellness visit.  The required HRA questions are integrated within this visit note. Family medical history and individual medical/surgical history were reviewed and updated.  A current height, weight, BMI, blood pressure, and pulse were recorded in the vitals section of the note and have been reviewed. Patient's medications, including supplements, were recorded in the chart and reviewed.  Current providers and suppliers were reviewed and updated.          Self-Assessment of Health: She rates her health as very good. She rates her confidence of being able to control/manage most of her health problems as very confident. Her physical/emotional health has limited her social activites not at all.  A review of cognitive impairment was performed, including ability to drive a car, manage finances, and any memory changes, and was found to be negative.  A review of functional ability, including bathing, dressing,  walking, and urine/bowel continence as well as level of safety was performed and was found to be negative.  Falls Risk: Has not had any falls or only one fall without injury in the past year.  In regard to hearing, she reports having trouble hearing the TV/radio when others do not and having to strain to hear or understand conversations, but not wearing hearing aid(s).  Concerning home safety, she reports that at home she DOES have adequate lighting, a skid resistant shower/tub, grab bars in the bath, handrails on stairs, functioning smoke alarms and absence of throw rugs.          Immunization Status: Up to date; Physical Activity: She exercises for at least 20 minutes 3 or more days/week.; Type of diet patient normally eats is described as well-balanced with fruits and vegetables Tobacco: She has never smoked.  Preventative Health updated today     ROS:     CONSTITUTIONAL:  Negative for fatigue and fever.      EYES:  Negative for blurred vision.      E/N/T:  Negative for diminished hearing and nasal congestion.      CARDIOVASCULAR:  Negative for chest pain and palpitations.      RESPIRATORY:  Negative for recent cough and dyspnea.      GASTROINTESTINAL:  Positive for abdominal pain ( diffuse ).   Negative for constipation, diarrhea, nausea, vomiting or change in stool caliber.      GENITOURINARY:  Negative for dysuria and urinary incontinence.      MUSCULOSKELETAL:  Positive for arthralgias.   Negative for myalgias.      NEUROLOGICAL:  Negative for paresthesias and weakness.      PSYCHIATRIC:  Negative for anxiety, depression and sleep disturbance.          Past Medical History / Family History / Social History:         Last Reviewed on 9/01/2020 10:52 AM by Chery Wright    Past Medical History:             PAST MEDICAL HISTORY         Positive for    Hyperlipidemia and    Hypertension;     Positive for    Hypothyroidism;     Positive for    Skin cancer: Squamous  and Basil cell; ;         CURRENT MEDICAL  PROVIDERS:    Dermatologist: Dr. Padilla    General Surgeon: Dr. Coker             ADVANCED DIRECTIVES: None         PREVENTIVE HEALTH MAINTENANCE             BONE DENSITY: was last done 8/7/2018 with the following abnormality noted-- Osteoporosis (unchanged from 2015) on alendronate     COLORECTAL CANCER SCREENING: Up to date (colonoscopy q10y; sigmoidoscopy q5y; Cologuard q3y) was last done 1/09; colonoscopy with normal results     MAMMOGRAM: Done within last 2 years and results in are chart was last done 6/25/2019 with normal results     PAP SMEAR: but the results are unknown No longer indicated due to age and history s/p hyst         PAST MEDICAL HISTORY             ADVANCE DIRECTIVE Living will she just keeps it at home         PAST MEDICAL HISTORY             ADVANCE DIRECTIVE Living will copy requested ./mlb         Surgical History:         Cholecystectomy    Hernia Repair: ventral hernia repair 11/2016;     Hysterectomy    Joint Replacement: 7/2018 - bilateral knee replacement;     Tonsillectomy     Splenectomy     Hernia Repair: incisional; x2;     Exploratory Laparotomy: for hemoperitoneum;         Family History:     Father: Lung Cancer     Mother: Sarcoidosis         Social History:     Occupation:    Retired     Marital Status: Single         Tobacco/Alcohol/Supplements:     Last Reviewed on 9/01/2020 10:52 AM by Chery Wright    Tobacco: She has never smoked.          Substance Abuse History:     Last Reviewed on 9/01/2020 10:52 AM by Chery Wright        Mental Health History:     Last Reviewed on 9/01/2020 10:52 AM by Chery Wright        Communicable Diseases (eg STDs):     Last Reviewed on 9/01/2020 10:52 AM by Chery Wright        Current Problems:     Last Reviewed on 2/28/2020 03:10 PM by Chery Wright    Hypothyroidism, unspecified    HLD - Mixed hyperlipidemia    Depression - Major depressive disorder, single episode, mild    HTN - Essential (primary) hypertension    Pain in left  hand    Acquired absence of spleen    SCC - Other specified malignant neoplasm of skin, unspecified    Actinic keratosis    Other osteoporosis without current pathological fracture    Primary osteoarthritis, right hand    Primary osteoarthritis, left hand    Ventral hernia without obstruction or gangrene    Anxiety disorder, unspecified    Encounter for other screening for malignant neoplasm of breast    Asymptomatic menopausal state        Immunizations:     Prevnar 13 (Pneumococcal PCV 13) 9/29/2019    zzPrevnar-13 11/4/2015    Fluzone (3 + years dose) 10/18/2018    Fluad pf 10/4/2017    Fluzone High-Dose pf (>=65 yr) 11/4/2015    Fluzone High-Dose pf (>=65 yr) 9/22/2016    Influenza High-Dose Virus Vaccine pf (>=65 yr) 9/29/2019    PNEUMOVAX 23 (Pneumococcal PPV23) 11/22/2016    Shingrix (Zoster recombinant) 10/18/2018        Allergies:     Last Reviewed on 2/28/2020 03:10 PM by Chery Wright    Ambien:      Hyaluronic Acid:      Methylprednisolone:          Current Medications:     Last Reviewed on 2/28/2020 03:10 PM by Chery Wright    PreserVision Areds 2 2 tabs qd     lisinopriL-hydrochlorothiazide 10-12.5 mg oral tablet [TAKE 1 TABLET BY MOUTH ONCE DAILY]    venlafaxine 150 mg oral Capsule, Extended Release 24 hr [TAKE 1 CAPSULE BY MOUTH ONCE DAILY]    venlafaxine 75 mg oral Capsule, Extended Release 24 hr [TAKE 1 CAPSULE BY MOUTH ONCE DAILY]    alendronate 70 mg oral tablet [Take 1 tablet(s) by mouth q week]    traZODone 100 mg oral tablet [TAKE 1/2-1 TABLET BY MOUTH ONCE DAILY AT BEDTIME IF NEEDED]    levothyroxine 88 mcg oral tablet [TAKE 1 TABLET BY MOUTH ONCE DAILY]    OTC Calcium 600mg w/ Vitamin D3 800IU Take one tablet once daily      Voltaren  1% Topical Gel [Apply to affected area BID]        Objective:        Vitals:         Current: 9/1/2020 10:23:44 AM    Ht:  5 ft, 0.75 in;  Wt: 134.2 lbs;  BMI: 25.6T: 98 F (temporal);  BP: 116/73 mm Hg (left arm, sitting);  P: 76 bpm (left arm (BP Cuff),  sitting);  sCr: 0.84 mg/dL;  GFR: 52.38VA: 20/25 OD, 20/25 OS (near, with correction)        Exams:     PHYSICAL EXAM:     GENERAL: vital signs recorded - well developed, well nourished;  well groomed;  no apparent distress;     EYES: extraocular movements intact; conjunctiva and cornea are normal; PERRLA;     E/N/T: OROPHARYNX:  normal mucosa, dentition, gingiva, and posterior pharynx;     RESPIRATORY: normal respiratory rate and pattern with no distress; normal breath sounds with no rales, rhonchi, wheezes or rubs;     CARDIOVASCULAR: normal rate; rhythm is regular;  no systolic murmur; no edema;     GASTROINTESTINAL: mild LUQ tenderness;  no guarding;  no rebound tenderness;  normal bowel sounds;     MUSCULOSKELETAL: normal gait; normal overall tone     NEUROLOGIC: mental status: alert and oriented x 3; Reflexes: brachioradialis: 2+; knee jerks: 2+;     PSYCHIATRIC: appropriate affect and demeanor; normal psychomotor function; normal speech pattern;         Assessment:         Z00.00   Encounter for general adult medical examination without abnormal findings       E03.9   Hypothyroidism, unspecified       Z12.31   Encounter for screening mammogram for malignant neoplasm of breast       Z12.11   Encounter for screening for malignant neoplasm of colon       M81.8   Other osteoporosis without current pathological fracture       I10   HTN - Essential (primary) hypertension       F41.9   Anxiety disorder, unspecified       R10.817   Generalized abdominal tenderness           ORDERS:         Meds Prescribed:       [Refilled] alendronate 70 mg oral tablet [Take 1 tablet(s) by mouth q week], #12 (twelve) tablets, Refills: 3 (three)       [Refilled] lisinopriL-hydrochlorothiazide 10-12.5 mg oral tablet [TAKE 1 TABLET BY MOUTH ONCE DAILY], #90 (ninety) tablets, Refills: 1 (one)       [Refilled] traZODone 100 mg oral tablet [TAKE 1/2-1 TABLET BY MOUTH ONCE DAILY AT BEDTIME IF NEEDED], #45 (forty five) tablets, Refills: 1  (one)       [Refilled] levothyroxine 88 mcg oral tablet [TAKE 1 TABLET BY MOUTH ONCE DAILY], #90 (ninety) tablets, Refills: 1 (one)       [Refilled] venlafaxine 150 mg oral Capsule, Extended Release 24 hr [TAKE 1 CAPSULE BY MOUTH ONCE DAILY], #90 (ninety) capsules, Refills: 1 (one)       [Refilled] venlafaxine 75 mg oral Capsule, Extended Release 24 hr [TAKE 1 CAPSULE BY MOUTH ONCE DAILY], #90 (ninety) capsules, Refills: 1 (one)       [New Rx] dicyclomine 10 mg oral capsule [take 1 capsule (10 mg) by oral route 2 times per day prn abdominal pain], #60 (sixty) capsules, Refills: 2 (two)         Lab Orders:       [id number]  COMP - [facility] Comp. Metabolic Panel  (Send-Out)            70740  TSH - [facility] TSH  (Send-Out)            APPTO  Appointment need  (In-House)            25670  OBIA - [facility] Occult blood by immunoassay  (Send-Out)              Procedures Ordered:         Annual wellness visit, includes a PPPS, subsequent visit  (In-House)              Other Orders:       1101F  Pt screen for fall risk; document no falls in past year or only 1 fall w/o injury in past year (ALMA)  (In-House)              Screening mammogram results documented  (Send-Out)            1124F  Advance Care Planning discussed and doc in MR; no surrogate named or advance care plan provided  (Send-Out)              Depression screen positive and follow up plan documented  (In-House)                      Plan:         Encounter for general adult medical examination without abnormal findingsShe is reportedly UTD on colon cancer screening; reports Dr. Coker recommended against colonoscopy when she saw him last year and instead ordered hemoccult screening.  Will repeat FIT testing this year as well.  Pap smear is no longer indicated by age and history.  She is due for mammogram, last done 6/2019 and this was normal; scheduled for 9/15.  She is due for DEXA, last done 8/2018 and this showed osteoporosis; scheduled for 9/15.  Currently on  Fosamax.  She is UTD on Pneumovax (11/2016), Prevnar (11/2015), Zostavax (8/2017), Shingrix (2018), and flu (9/2019).  She is due for Td; [facility]  She is due for routine labs including CMP and TSH; ordered.  No fall risk, no memory issues, no signs/symptoms of depression.  She lives alone.  She is able to drive and perform ADLs/manage finances independently.  Hearing is adequate.  She has a living will but not in the chart.  Preventive services handout and safety handout were given to her.  Current doctor list updated.  RTC 6 months.    MIPS PHQ-9 Depression Screening: Completed form scanned and in chart; Total Score 6 Positive Depression Screen: Stable on medications. No suicidal ideation.  ADVANCE DIRECTIVES (Please update in PMH): Living will     FOLLOW-UP: Schedule a follow-up visit in 6 months.:.  f/u HTN with [place]          Orders:         Annual wellness visit, includes a PPPS, subsequent visit  (In-House)            1101F  Pt screen for fall risk; document no falls in past year or only 1 fall w/o injury in past year (ALMA)  (In-House)              Screening mammogram results documented  (Send-Out)            1124F  Advance Care Planning discussed and doc in MR; no surrogate named or advance care plan provided  (Send-Out)            APPTO  Appointment need  (In-House)              Depression screen positive and follow up plan documented  (In-House)              Hypothyroidism, unspecified    LABORATORY:  Labs ordered to be performed today include Comprehensive metabolic panel and TSH.            Orders:       [id number]  COMP - [facility] Comp. Metabolic Panel  (Send-Out)            86932  TSH - [facility] TSH  (Send-Out)              Encounter for screening mammogram for malignant neoplasm of breastAlready scheduled.        Encounter for screening for malignant neoplasm of colon    LABORATORY:  Labs ordered to be performed today include Hemocult, Routine Screening.            Orders:        [id number]  [facility] Occult blood by immunoassay  (Send-Out)              Other osteoporosis without current pathological fractureDEXA already scheduled.          Prescriptions:       [Refilled] alendronate 70 mg oral tablet [Take 1 tablet(s) by mouth q week], #12 (twelve) tablets, Refills: 3 (three)         HTN - Essential (primary) hypertension          Prescriptions:       [Refilled] lisinopriL-hydrochlorothiazide 10-12.5 mg oral tablet [TAKE 1 TABLET BY MOUTH ONCE DAILY], #90 (ninety) tablets, Refills: 1 (one)         Anxiety disorder, unspecified          Prescriptions:       [Refilled] traZODone 100 mg oral tablet [TAKE 1/2-1 TABLET BY MOUTH ONCE DAILY AT BEDTIME IF NEEDED], #45 (forty five) tablets, Refills: 1 (one)       [Refilled] levothyroxine 88 mcg oral tablet [TAKE 1 TABLET BY MOUTH ONCE DAILY], #90 (ninety) tablets, Refills: 1 (one)       [Refilled] venlafaxine 150 mg oral Capsule, Extended Release 24 hr [TAKE 1 CAPSULE BY MOUTH ONCE DAILY], #90 (ninety) capsules, Refills: 1 (one)       [Refilled] venlafaxine 75 mg oral Capsule, Extended Release 24 hr [TAKE 1 CAPSULE BY MOUTH ONCE DAILY], #90 (ninety) capsules, Refills: 1 (one)         Generalized abdominal tendernessTesting was unremarkable and did not show any abnormalities.  Trying empiric dicyclomine.          Prescriptions:       [New Rx] dicyclomine 10 mg oral capsule [take 1 capsule (10 mg) by oral route 2 times per day prn abdominal pain], #60 (sixty) capsules, Refills: 2 (two)             Patient Recommendations:        For  Encounter for general adult medical examination without abnormal findings:    Schedule a follow-up visit in 6 months.                APPOINTMENT INFORMATION:        Monday Tuesday Wednesday Thursday Friday Saturday Sunday            Time:___________________AM  PM   Date:_____________________             Charge Capture:         Primary Diagnosis:     Z00.00  Encounter for general adult medical examination without  abnormal findings           Orders:        Annual wellness visit, includes a PPPS, subsequent visit  (In-House)            1101F  Pt screen for fall risk; document no falls in past year or only 1 fall w/o injury in past year (ALMA)  (In-House)            APPTO  Appointment need  (In-House)              Depression screen positive and follow up plan documented  (In-House)              E03.9  Hypothyroidism, unspecified     Z12.31  Encounter for screening mammogram for malignant neoplasm of breast     Z12.11  Encounter for screening for malignant neoplasm of colon     M81.8  Other osteoporosis without current pathological fracture     I10  HTN - Essential (primary) hypertension     F41.9  Anxiety disorder, unspecified     R10.817  Generalized abdominal tenderness

## 2021-05-18 NOTE — PROGRESS NOTES
"Lena White. 1945     Office/Outpatient Visit    Visit Date: Fri, Feb 22, 2019 01:30 pm    Provider: Chery Wright MD (Assistant: Glenna Hutchison LPN)    Location: [facility]        Electronically signed by Chery Wright MD on  02/22/2019 01:54:27 PM                             SUBJECTIVE:        CC:     Ms. White is a 73 year old White female.  6 month follow up;         HPI: Lena is here today for routine follow-up.        She does feel like she has been having some sinus pressure and chest congestion.  No known lung pathology.  She denies wheezing.  She usually spends the winter in [place] and she missed her trip this year.          She is on lisinopril/HCTZ for HTN.  BP has been well controlled.  No CP, palpitations, SOB.        She is on pravastatin for HLD.  No myalgias or other adverse effects.        She is on Synthroid for hypothyroidism.  No heat/cold intolerance, no changes in hair or skin.        She is on venlafaxine for depression.  She is on trazodone for sleep.  Mood has been \"good.\"  No depressed mood or anhedonia.  No anxiety or panic attacks.        She is on alendronate for osetoporosis.  Last DEXA was 8/2018.     ROS:     CONSTITUTIONAL:  Negative for fatigue and fever.      EYES:  Negative for blurred vision.      E/N/T:  Negative for diminished hearing and nasal congestion.      CARDIOVASCULAR:  Negative for chest pain and palpitations.      RESPIRATORY:  Negative for recent cough and dyspnea.      GASTROINTESTINAL:  Negative for abdominal pain, constipation, diarrhea, nausea, vomiting and change in stool caliber.      MUSCULOSKELETAL:  Negative for arthralgias and myalgias.      PSYCHIATRIC:  Negative for anxiety, depression and sleep disturbance.          PMH/FMH/SH:     Last Reviewed on 2/22/2019 01:37 PM by Chery Wright    Past Medical History:             PAST MEDICAL HISTORY         Positive for    Hyperlipidemia and    Hypertension;     Positive for    " Hypothyroidism;     Positive for    Skin cancer: Squamous  and Basil cell; ;         CURRENT MEDICAL PROVIDERS:    Dermatologist: Dr. Padilla    General Surgeon: Dr. Coker             ADVANCED DIRECTIVES: None         PREVENTIVE HEALTH MAINTENANCE             BONE DENSITY: was last done 8/7/2018 with the following abnormality noted-- Osteoporosis (unchanged from 2015) on alendronate     COLORECTAL CANCER SCREENING: Up to date (colonoscopy q10y; sigmoidoscopy q5y; Cologuard q3y) was last done 1/09; colonoscopy with normal results     MAMMOGRAM: Done within last 2 years and results in are chart was last done 6/7/2018 with normal results     PAP SMEAR: but the results are unknown No longer indicated due to age and history         PAST MEDICAL HISTORY             ADVANCE DIRECTIVE Living will she just keeps it at home         PAST MEDICAL HISTORY             ADVANCE DIRECTIVE Living will copy requested ./mlb         Surgical History:         Cholecystectomy    Hernia Repair: ventral hernia repair 11/2016;     Hysterectomy    Joint Replacement: 7/2018 - bilateral knee replacement;     Tonsillectomy      Splenectomy      Hernia Repair: incisional; x2;     Exploratory Laparotomy: for hemoperitoneum;         Family History:     Father: Lung Cancer     Mother: Sarcoidosis         Social History:     Occupation:    Retired         Tobacco/Alcohol/Supplements:     Last Reviewed on 2/22/2019 01:37 PM by Chery Wright    Tobacco: She has never smoked.          Substance Abuse History:     Last Reviewed on 2/22/2019 01:37 PM by Chery Wright        Mental Health History:     Last Reviewed on 2/22/2019 01:37 PM by Chery Wright        Communicable Diseases (eg STDs):     Last Reviewed on 2/22/2019 01:37 PM by Chery Wright            Current Problems:     Last Reviewed on 8/21/2018 10:57 AM by Chery Wright    Osteoporosis, other     Squamous cell carcinoma of skin     Actinic keratosis     Anxiety     History of  splenectomy     Depression     Hypothyroidism     Hyperlipidemia     Essential hypertension     Hand pain         Immunizations:     zzPrevnar-13 11/4/2015     Fluzone (3 + years dose) 10/18/2018     Fluad pf 10/4/2017     Fluzone High-Dose pf (>=65 yr) 11/4/2015     Fluzone High-Dose pf (>=65 yr) 9/22/2016     PNEUMOVAX 23 (Pneumococcal PPV23) 11/22/2016     Shingrix (Zoster recombinant) 10/18/2018         Allergies:     Last Reviewed on 2/22/2019 01:31 PM by Glenna Hutchison    Ambien:    Hyaluronic Acid:    Methylprednisolone:        Current Medications:     Last Reviewed on 2/22/2019 01:32 PM by Glenna Hutchison    Alendronate Sodium 70mg Tablet Take 1 tablet(s) by mouth q week     Lisinopril/Hydrochlorothiazide 10mg/12.5mg Tablet one a day     Pravastatin 20mg Tablet 1 tab q day hs     Synthroid 0.088mg Tablet Take 1 tablet(s) by mouth daily     Trazodone HCl 100mg Tablet 1/2 to 1 tab HS prn     Venlafaxine HCl 150mg Capsules, Extended Release Take 1 capsule(s) by mouth daily     Venlafaxine HCl 75mg Capsules, Extended Release 1 cap daily     Aspirin (ASA) 325mg Caplet Take one tablet once daily for 30 days     OTC Calcium 600mg w/ Vitamin D3 800IU Take one tablet once daily     PreserVision Areds 2 2 tabs qd         OBJECTIVE:        Vitals:         Current: 2/22/2019 1:34:50 PM    Ht:  5 ft, 0.75 in;  Wt: 137.6 lbs;  BMI: 26.2    T: 97.5 F (oral);  BP: 121/60 mm Hg (left arm, sitting);  P: 79 bpm (left arm (BP Cuff), sitting);  sCr: 0.81 mg/dL;  GFR: 55.72        Exams:     PHYSICAL EXAM:     GENERAL: vital signs recorded - well developed, well nourished;  well groomed;  no apparent distress;     EYES: extraocular movements intact; conjunctiva and cornea are normal; PERRLA;     E/N/T: OROPHARYNX:  normal mucosa, dentition, gingiva, and posterior pharynx;     RESPIRATORY: normal respiratory rate and pattern with no distress; normal breath sounds with no rales, rhonchi, wheezes or rubs;     CARDIOVASCULAR:  normal rate; rhythm is regular;  no systolic murmur; no edema;     GASTROINTESTINAL: nontender; normal bowel sounds;     BREAST/INTEGUMENT: a rash is noted underneath the breasts;  the color is mainly pink;  it is best characterized as papular and plaque-like;     MUSCULOSKELETAL: normal gait; normal overall tone         ASSESSMENT           401   I10  Essential hypertension              DDx:     272.4   E78.2  Hyperlipidemia              DDx:     244.9   E03.9  Hypothyroidism              DDx:     296.20   F32.0  Depression              DDx:     733.09   M81.8  Osteoporosis, other              DDx:     112.9   B37.9  Candidiasis, unspecified site              DDx:         ORDERS:         Meds Prescribed:       Nystatin 100,000U/1gm Cream apply 2-3 times a day  #30 (Thirty) gm Refills: 2       Refill of: Lisinopril/Hydrochlorothiazide 10mg/12.5mg Tablet one a day  #90 (Ninety) tablet(s) Refills: 1       Refill of: Pravastatin 20mg Tablet 1 tab q day hs  #90 (Ninety) tablet(s) Refills: 1       Refill of: Synthroid (Levothyroxine Sodium) 0.088mg Tablet Take 1 tablet(s) by mouth daily  #90 (Ninety) tablet(s) Refills: 1       Refill of: Trazodone HCl 100mg Tablet 1/2 to 1 tab HS prn  #45 (Forty Five) tablet(s) Refills: 1       Refill of: Venlafaxine HCl 150mg Capsules, Extended Release Take 1 capsule(s) by mouth daily  #90 (Ninety) capsule(s) Refills: 1       Refill of: Venlafaxine HCl 75mg Capsules, Extended Release 1 cap daily  #90 (Ninety) capsule(s) Refills: 1         Radiology/Test Orders:       3014F  Screening mammography results documented and reviewed (PV)1  (In-House)           Lab Orders:       APPTO  Appointment need  (In-House)         [id number]  HTNLP - [facility] CMP AND LIPID: [id number], [id number]  (Send-Out)         12948  TSH - [facility] TSH  (Send-Out)                   PLAN:          Essential hypertension BP at goal.  Refills sent.  Checking labs.  RTC 6 months for AWV.     LABORATORY:  Labs ordered to be performed today  include HTN/Lipid Panel: CMP, Lipid.  MIPS Vaccines Flu and Pneumonia updated in Shot record     MAMMOGRAM: Done within last 2 years and results in are chart     FOLLOW-UP: Schedule a follow-up visit in 6 months..  Medicare wellness 30 min with Adriana           Prescriptions:       Refill of: Lisinopril/Hydrochlorothiazide 10mg/12.5mg Tablet one a day  #90 (Ninety) tablet(s) Refills: 1           Orders:       APPTO  Appointment need  (In-House)         3014F  Screening mammography results documented and reviewed (PV)1  (In-House)         86685  HTNLP - [facility] CMP AND LIPID: [id number]  (Send-Out)            Hyperlipidemia Stable.  Refills sent.  Checking labs.            Prescriptions:       Refill of: Pravastatin 20mg Tablet 1 tab q day hs  #90 (Ninety) tablet(s) Refills: 1          Hypothyroidism Stable.  Refills sent.  Checking labs.      LABORATORY:  Labs ordered to be performed today include TSH.            Prescriptions:       Refill of: Synthroid (Levothyroxine Sodium) 0.088mg Tablet Take 1 tablet(s) by mouth daily  #90 (Ninety) tablet(s) Refills: 1           Orders:       [id number]  TSH - [facility] TSH  (Send-Out)            Depression Stable.  Refills sent.           Prescriptions:       Refill of: Trazodone HCl 100mg Tablet 1/2 to 1 tab HS prn  #45 (Forty Five) tablet(s) Refills: 1       Refill of: Venlafaxine HCl 150mg Capsules, Extended Release Take 1 capsule(s) by mouth daily  #90 (Ninety) capsule(s) Refills: 1       Refill of: Venlafaxine HCl 75mg Capsules, Extended Release 1 cap daily  #90 (Ninety) capsule(s) Refills: 1          Osteoporosis, other Stable.  No refills needed.  UTD on DEXA.          Candidiasis, unspecified site Candidiasis between and under bilateral breasts.  Sending in Nystatin cream.  Let me know if sx worsen or fail to improve.           Prescriptions:       Nystatin 100,000U/1gm Cream apply 2-3 times a day  #30 (Thirty) gm Refills: 2             Patient Recommendations:        For   Essential hypertension:     Schedule a follow-up visit in 6 months.                APPOINTMENT INFORMATION:        Monday Tuesday Wednesday Thursday Friday Saturday Sunday            Time:___________________AM  PM   Date:_____________________             CHARGE CAPTURE           **Please note: ICD descriptions below are intended for billing purposes only and may not represent clinical diagnoses**        Primary Diagnosis:         401 Essential hypertension            I10    Essential (primary) hypertension              Orders:          [id number]   Office/outpatient visit; established patient, level 4  (In-House)             APPTO   Appointment need  (In-House)             3014F   Screening mammography results documented and reviewed (PV)1  (In-House)           272.4 Hyperlipidemia            E78.2    Mixed hyperlipidemia    244.9 Hypothyroidism            E03.9    Hypothyroidism, unspecified    296.20 Depression            F32.0    Major depressive disorder, single episode, mild    733.09 Osteoporosis, other            M81.8    Other osteoporosis without current pathological fracture    112.9 Candidiasis, unspecified site            B37.9    Candidiasis, unspecified

## 2021-05-18 NOTE — PROGRESS NOTES
Lena White 1945     Office/Outpatient Visit    Visit Date: Thu, Apr 5, 2018 03:53 pm    Provider: Ольга Bermudez N.P. (Assistant: Mala Castro MA)    Location: [facility]        Electronically signed by Ольга Bermudez N.P. on  04/10/2018 09:42:36 AM                             SUBJECTIVE:        CC:     Ms. White is a 72 year old White female.  medicine refill/check up/left knee pain;         HPI:         Concerning hyperlipidemia, current treatment includes Pravachol and a low cholesterol/low fat diet.  Compliance with treatment has been good; she takes her medication as directed.  She denies experiencing any hypercholesterolemia related symptoms.          Additionally, she presents with history of essential hypertension.  her current cardiac medication regimen includes lisinopril/ hctz.  She did not bring her blood pressure diary, but says that pressures have been well controlled.  She is tolerating the medication well without side effects.  Compliance with treatment has been good.          Hypothyroidism details; she is currently taking Synthroid, 88 mcg daily.  TSH was last checked more than 6 months ago.  The result was reported as normal.  She denies any related symptoms.  She reports no symptoms suggestive of adverse medication effect.          Additionally, she presents with history of knee pain.  the affected area is the left knee.  Pain began 2 months ago.  She describes the intensity of pain as mild.  The pattern of joint symptoms has been stable and nonprogressive.          In regard to the osteoporosis, other, requests refills of alendronate.  denies side effects.  requests refills.      ROS:     CONSTITUTIONAL:  Negative for chills, fatigue, fever, and weight change.      CARDIOVASCULAR:  Negative for chest pain, palpitations, tachycardia, orthopnea, and edema.      RESPIRATORY:  Negative for cough, dyspnea, and hemoptysis.      MUSCULOSKELETAL:  Positive for left knee  pain.      NEUROLOGICAL:  Negative for dizziness, headaches, paresthesias, and weakness.      ENDOCRINE:  Negative for hair loss, heat/cold intolerance, polydipsia, and polyphagia.      PSYCHIATRIC:  Negative for anxiety, depression, and sleep disturbances.          PMH/FMH/SH:     Last Reviewed on 8/03/2017 03:13 PM by Chery Wright    Past Medical History:             PAST MEDICAL HISTORY         Positive for    Hyperlipidemia and    Hypertension;     Positive for    Hypothyroidism;     Positive for    Skin cancer: Squamous  and Basil cell; ;         CURRENT MEDICAL PROVIDERS:    Dermatologist: Dr. Padilla    General Surgeon: Dr. Coker             ADVANCED DIRECTIVES: None         PREVENTIVE HEALTH MAINTENANCE             BONE DENSITY: was last done 11/5/15 with the following abnormality noted-- Osteoporosis     COLORECTAL CANCER SCREENING: Up to date (colonoscopy q10y; sigmoidoscopy q5y; Cologuard q3y) was last done 1/09; colonoscopy with normal results     INFLUENZA VACCINE: was last done 9/22/16     MAMMOGRAM: Done within last 2 years and results in are chart was last done 5/4/17 with normal results     Prevnar 13: was last done 11/4/15     PAP SMEAR: but the results are unknown No longer indicated due to age and history     PNEUMOCOCCAL 23 VACCINE: was last done 10/22/16     SHINGLES VACCINE: Pt insists that she has had Zostavax previously; we have called [facility], [facility] and [facility] pharmacies in [place], [place] but no one has records of giving it to her     Tdap VACCINE: was last done 2010         PAST MEDICAL HISTORY             ADVANCE DIRECTIVE Living will she just keeps it at home         Surgical History:         Cholecystectomy    Hernia Repair: ventral hernia repair 11/2016;     Hysterectomy    Tonsillectomy      Splenectomy      Hernia Repair: incisional; x2;     Exploratory Laparotomy: for hemoperitoneum;         Family History:     Father: Lung Cancer     Mother:  Sarcoidosis         Social History:     Occupation:    Retired         Tobacco/Alcohol/Supplements:     Last Reviewed on 8/03/2017 03:13 PM by Chery Wright    Tobacco: She has never smoked.          Substance Abuse History:     Last Reviewed on 8/03/2017 03:13 PM by Chery Wright        Mental Health History:     Last Reviewed on 8/03/2017 03:13 PM by Chery Wright        Communicable Diseases (eg STDs):     Last Reviewed on 8/03/2017 03:13 PM by Chery Wright            Current Problems:     Last Reviewed on 8/03/2017 03:13 PM by Chery Wright    Osteoporosis, other     Squamous cell carcinoma of skin     Actinic keratosis     Anxiety     History of splenectomy     Depression     Hypothyroidism     Hyperlipidemia     Essential hypertension     Hand pain         Immunizations:     zzPrevnar-13 11/4/2015     Fluzone High-Dose pf (>=65 yr) 11/4/2015     Fluzone High-Dose pf (>=65 yr) 9/22/2016     zzFluad pf 10/4/2017     PNEUMOVAX 23 (Pneumococcal PPV23) 11/22/2016         Allergies:     Last Reviewed on 8/03/2017 03:13 PM by Chery Wright    Ambien:    Hyaluronic Acid:    Methylprednisolone:        Current Medications:     Last Reviewed on 8/03/2017 03:13 PM by Chery Wright    Lisinopril/Hydrochlorothiazide 10mg/12.5mg Tablet one a day     Pravastatin 20mg Tablet 1 tab q day hs     Synthroid 0.088mg Tablet Take 1 tablet(s) by mouth daily     Venlafaxine HCl 150mg Capsules, Extended Release Take 1 capsule(s) by mouth daily     Alendronate Sodium 70mg Tablet Take 1 tablet(s) by mouth q week     Trazodone HCl 100mg Tablet 1/2 to 1 tab HS prn     PreserVision Areds 2 2 tabs qd         OBJECTIVE:        Vitals:         Current: 4/5/2018 3:55:43 PM    Ht:  5 ft, 0.75 in;  Wt: 132.7 lbs;  BMI: 25.3    T: 97.9 F (oral);  BP: 120/70 mm Hg (left arm, sitting);  P: 83 bpm (left arm (BP Cuff), sitting);  sCr: 0.77 mg/dL;  GFR: 58.58        Exams:     PHYSICAL EXAM:     GENERAL:  well developed and nourished;  appropriately groomed; in no apparent distress;     RESPIRATORY: normal respiratory rate and pattern with no distress; normal breath sounds with no rales, rhonchi, wheezes or rubs;     CARDIOVASCULAR: normal rate; rhythm is regular;  no edema;     MUSCULOSKELETAL: pain with range of motion in: left hip flexion and extension;     NEUROLOGIC: mental status: alert and oriented x 3; GROSSLY INTACT     PSYCHIATRIC:  appropriate affect and demeanor; normal speech pattern; grossly normal memory;         ASSESSMENT           272.4   E78.2  Hyperlipidemia              DDx:     401   I10  Essential hypertension              DDx:     244.9   E03.9  Hypothyroidism              DDx:     719.46   M25.562  Knee pain              DDx:     733.09   M81.8  Osteoporosis, other              DDx:         ORDERS:         Meds Prescribed:       Refill of: Lisinopril/Hydrochlorothiazide 10mg/12.5mg Tablet one a day  #90 (Ninety) tablet(s) Refills: 1       Refill of: Pravastatin 20mg Tablet 1 tab q day hs  #90 (Ninety) tablet(s) Refills: 1       Refill of: Synthroid (Levothyroxine Sodium) 0.088mg Tablet Take 1 tablet(s) by mouth daily  #90 (Ninety) tablet(s) Refills: 1       Refill of: Alendronate Sodium 70mg Tablet Take 1 tablet(s) by mouth q week  #12 (Twelve) tablet(s) Refills: 3         Lab Orders:       [id number]  HTNLP - [facility] CMP AND LIPID: [id number], [id number]  (Send-Out)         [id number]  TSH - [facility] TSH  (Send-Out)           Procedures Ordered:       REFER  Referral to Specialist or Other Facility  (Send-Out)                   PLAN: [facility] end of march          Hyperlipidemia         RECOMMENDATIONS given include: exercise and low cholesterol/low fat diet.            Prescriptions:       Refill of: Pravastatin 20mg Tablet 1 tab q day hs  #90 (Ninety) tablet(s) Refills: 1          Essential hypertension     LABORATORY:  Labs ordered to be performed today include HTN/Lipid Panel: CMP, Lipid.      RECOMMENDATIONS given include: avoid  pseudoephedrine or other stimulants/decongestants in common cold remedies, decrease consumption of alcohol, perform routine monitoring of blood pressure with home blood pressure cuff, have spouse or friend monitor for loud snoring/sleep apnea, exercise, and reduction of dietary salt intake.            Prescriptions:       Refill of: Lisinopril/Hydrochlorothiazide 10mg/12.5mg Tablet one a day  #90 (Ninety) tablet(s) Refills: 1           Orders:       [id number]  HTNLP - [facility] CMP AND LIPID: [id number], [id number]  (Send-Out)            Hypothyroidism     LABORATORY:  Labs ordered to be performed today include TSH.            Prescriptions:       Refill of: Synthroid (Levothyroxine Sodium) 0.088mg Tablet Take 1 tablet(s) by mouth daily  #90 (Ninety) tablet(s) Refills: 1           Orders:       [id number]  TSH - [facility] TSH  (Send-Out)            Knee pain ortho         REFERRALS:  Referral initiated to an orthopedist.            Orders:       REFER  Referral to Specialist or Other Facility  (Send-Out)            Osteoporosis, other         to get bonde density scan may 2018 with mammogram.  tickled per dr hurtado.            Prescriptions:       Refill of: Alendronate Sodium 70mg Tablet Take 1 tablet(s) by mouth q week  #12 (Twelve) tablet(s) Refills: 3             Other Orders:       [id number]  Office/outpatient visit; established patient, level 4  (In-House)           Patient Recommendations:        For  Hyperlipidemia:     Maintain a regular exercise program. Reduce the amount of cholesterol and saturated fat in your diet.          For  Essential hypertension:     Certain decongestants and stimulants (like pseudoephedrine) in common cold remedies raise blood pressure, sometimes to dangerously high levels. Never take with MAO inhibitors! Avoid alcohol as it can contribute to elevated blood pressure. Begin monitoring your blood pressure by brief nurse visits at our office, a home blood pressure monitor, or by checking on the machines in  pharmacies or stores.  Keep a log of the readings. Obstructive sleep apnea is much more prevalent than historically reported and is a greatly under recognized cause of hypertension. If you have loud snoring, if you wake up tired and feel tired thru out the day, you may have sleep apnea.  One way to suspect this is if a loved one reports that you snore very loudly or if you seem to quit breathing for a time while you are asleep.  If this describes you, you should consult your doctor about have a sleep study performed. Maintain a regular exercise program. Reduce the amount of salt in your diet.              CHARGE CAPTURE           **Please note: ICD descriptions below are intended for billing purposes only and may not represent clinical diagnoses**        Primary Diagnosis:         272.4 Hyperlipidemia            E78.2    Mixed hyperlipidemia    401 Essential hypertension            I10    Essential (primary) hypertension    244.9 Hypothyroidism            E03.9    Hypothyroidism, unspecified    719.46 Knee pain            M25.562    Pain in left knee    733.09 Osteoporosis, other            M81.8    Other osteoporosis without current pathological fracture        Other Orders:           [id number]   Office/outpatient visit; established patient, level 4  (In-House)           ADDENDUMS:      ____________________________________    Date: 04/10/2018 12:21 PM    Author: Yamel Corrigan         Visit Note Faxed to:        User Entered Recipient; Number [phone]     Health Summary Faxed to:        User Entered Recipient; Number [phone]

## 2021-05-18 NOTE — PROGRESS NOTES
Lena White 1945     Office/Outpatient Visit    Visit Date: Tue, Aug 21, 2018 10:39 am    Provider: Chery Wright MD (Assistant: Daksha Martinez MA)    Location: [facility]        Electronically signed by Chery Wright MD on  08/21/2018 11:21:19 AM                             SUBJECTIVE:        CC:     Ms. White is a 72 year old White female.  Patient is here for Medicare Well[place] PE.;         HPI:     She is UTD on colonoscopy, last done 1/2009 and this was normal.  Pap smears are no longer indicated by age and history.  She is UTD on mammogram, last done 6/2018 and this was normal.  She is UTD on DEXA, last done 8/2018 and this showed unchanged osteoporosis.  She is on alendronate.  She is UTD on Pneumovax (11/2016), Prevnar (11/2015), Td (2011) and flu (10/2017).  She is due for Shingrix ahd Havrix.  She is UTD on labs.         She had her bilateral knee replacement.     Ms. White is here for a Medicare wellness visit.  Individual and family medical history was reviewed and updated A list of current providers and suppliers reviewed and updated A current height, weight, BMI, blood pressure, and pulse were recorded in the vitals section of the note and have been reviewed A review of cognitive impairment was performed and was negative.  A review of functional ability and level of safety was performed and was negative In regard to hearing, she reports having to strain to hear or understand conversations, but not having trouble hearing the TV/radio when others do not or wearing hearing aid(s).  Concerning home safety, she reports that at home she DOES have grab bars in the bath, handrails on stairs and functioning smoke alarms, but not throw rugs, poor lighting or a slippery bath or shower.      Immunization Status: Patient is due for pnuemovax 23;     Physical Activity: Exercises at least 3 times per week; Has not had any falls or only one fall without injury in the past year.  Advance  Care Directive updated today in PMH Tobacco: She has never smoked.    Preventative Health updated today In regard to the health checkup,         PHQ-9 Depression Screening: Completed form scanned and in chart; Total Score 7     ROS:     CONSTITUTIONAL:  Negative for fatigue and fever.      E/N/T:  Negative for diminished hearing and nasal congestion.      CARDIOVASCULAR:  Negative for chest pain and palpitations.      RESPIRATORY:  Negative for recent cough and dyspnea.      GASTROINTESTINAL:  Negative for abdominal pain, constipation, diarrhea, nausea, vomiting and change in stool caliber.      GENITOURINARY:  Negative for dysuria and urinary incontinence.      MUSCULOSKELETAL:  Negative for arthralgias and myalgias.      NEUROLOGICAL:  Positive for numbness radiating down the L lateral leg when she twists the wrong way.   Negative for paresthesias or weakness.      PSYCHIATRIC:  Negative for anxiety, depression and sleep disturbance.          PMH/FMH/SH:     Last Reviewed on 8/21/2018 10:57 AM by Chery Wright    Past Medical History:             PAST MEDICAL HISTORY         Positive for    Hyperlipidemia and    Hypertension;     Positive for    Hypothyroidism;     Positive for    Skin cancer: Squamous  and Basil cell; ;         CURRENT MEDICAL PROVIDERS:    Dermatologist: Dr. Padilla    General Surgeon: Dr. Coker             ADVANCED DIRECTIVES: None         PREVENTIVE HEALTH MAINTENANCE             BONE DENSITY: was last done 8/7/2018 with the following abnormality noted-- Osteoporosis (unchanged from 2015) on alendronate     COLORECTAL CANCER SCREENING: Up to date (colonoscopy q10y; sigmoidoscopy q5y; Cologuard q3y) was last done 1/09; colonoscopy with normal results     MAMMOGRAM: Done within last 2 years and results in are chart was last done 6/7/2018 with normal results     PAP SMEAR: but the results are unknown No longer indicated due to age and history         PAST MEDICAL HISTORY             ADVANCE  DIRECTIVE Living will she just keeps it at home         PAST MEDICAL HISTORY             ADVANCE DIRECTIVE Living will copy requested ./mlb         Surgical History:         Cholecystectomy    Hernia Repair: ventral hernia repair 11/2016;     Hysterectomy    Joint Replacement: 7/2018 - bilateral knee replacement;     Tonsillectomy      Splenectomy      Hernia Repair: incisional; x2;     Exploratory Laparotomy: for hemoperitoneum;         Family History:     Father: Lung Cancer     Mother: Sarcoidosis         Social History:     Occupation:    Retired         Tobacco/Alcohol/Supplements:     Last Reviewed on 8/21/2018 10:57 AM by Chery Wright    Tobacco: She has never smoked.          Substance Abuse History:     Last Reviewed on 8/21/2018 10:57 AM by Chery Wright        Mental Health History:     Last Reviewed on 8/21/2018 10:57 AM by Chery Wright        Communicable Diseases (eg STDs):     Last Reviewed on 8/21/2018 10:57 AM by Chery Wright            Current Problems:     Last Reviewed on 8/03/2017 03:13 PM by Chery Wright    Osteoporosis, other     Squamous cell carcinoma of skin     Actinic keratosis     Anxiety     History of splenectomy     Depression     Hypothyroidism     Hyperlipidemia     Essential hypertension     Hand pain         Immunizations:     zzPrevnar-13 11/4/2015     Fluzone High-Dose pf (>=65 yr) 11/4/2015     Fluzone High-Dose pf (>=65 yr) 9/22/2016     zzFluad pf 10/4/2017     PNEUMOVAX 23 (Pneumococcal PPV23) 11/22/2016         Allergies:     Last Reviewed on 8/21/2018 10:44 AM by Daksha Martinez    Ambien:    Hyaluronic Acid:    Methylprednisolone:        Current Medications:     Last Reviewed on 8/21/2018 10:44 AM by Daksha Martinez    Venlafaxine HCl 75mg Capsules, Extended Release 1 cap daily     Venlafaxine HCl 150mg Capsules, Extended Release Take 1 capsule(s) by mouth daily     Trazodone HCl 100mg Tablet 1/2 to 1 tab HS prn     Alendronate Sodium 70mg Tablet Take 1  tablet(s) by mouth q week     Lisinopril/Hydrochlorothiazide 10mg/12.5mg Tablet one a day     Pravastatin 20mg Tablet 1 tab q day hs     Synthroid 0.088mg Tablet Take 1 tablet(s) by mouth daily     PreserVision Areds 2 2 tabs qd         OBJECTIVE:        Vitals:         Current: 8/21/2018 10:43:42 AM    Ht:  5 ft, 0.75 in;  Wt: 132.8 lbs;  BMI: 25.3    T: 98.9 F (oral);  BP: 126/80 mm Hg (left arm, sitting);  P: 74 bpm (left arm (BP Cuff), sitting);  sCr: 0.81 mg/dL;  GFR: 55.70    VA: 20/100 OD, 20/25 OS (near, with correction)        Exams:     PHYSICAL EXAM:     GENERAL: vital signs recorded - well developed, well nourished;  well groomed;  no apparent distress;     EYES: extraocular movements intact; conjunctiva and cornea are normal; PERRLA;     E/N/T: OROPHARYNX:  normal mucosa, dentition, gingiva, and posterior pharynx;     RESPIRATORY: normal respiratory rate and pattern with no distress; normal breath sounds with no rales, rhonchi, wheezes or rubs;     CARDIOVASCULAR: normal rate; rhythm is regular;  no systolic murmur; no edema;     GASTROINTESTINAL: nontender; normal bowel sounds;     LYMPHATIC: no enlargement of cervical or facial nodes;     MUSCULOSKELETAL: normal gait; normal overall tone     NEUROLOGIC: mental status: alert and oriented x 3; Reflexes: brachioradialis: 2+; knee jerks: 2+;     PSYCHIATRIC: appropriate affect and demeanor; normal psychomotor function; normal speech pattern;         ASSESSMENT           V70.0   Z00.00  Health checkup              DDx:     V79.0   Z13.89  Screening for depression              DDx:         ORDERS:         Meds Prescribed:       Refill of: Venlafaxine HCl 75mg Capsules, Extended Release 1 cap daily  #90 (Ninety) capsule(s) Refills: 1       Refill of: Venlafaxine HCl 150mg Capsules, Extended Release Take 1 capsule(s) by mouth daily  #90 (Ninety) capsule(s) Refills: 1       Refill of: Trazodone HCl 100mg Tablet 1/2 to 1 tab HS prn  #45 (Forty Five) tablet(s)  Refills: 1       Refill of: Alendronate Sodium 70mg Tablet Take 1 tablet(s) by mouth q week  #12 (Twelve) tablet(s) Refills: 3       Refill of: Lisinopril/Hydrochlorothiazide 10mg/12.5mg Tablet one a day  #90 (Ninety) tablet(s) Refills: 1       Refill of: Pravastatin 20mg Tablet 1 tab q day hs  #90 (Ninety) tablet(s) Refills: 1       Refill of: Synthroid (Levothyroxine Sodium) 0.088mg Tablet Take 1 tablet(s) by mouth daily  #90 (Ninety) tablet(s) Refills: 1         Radiology/Test Orders:       3014F  Screening mammography results documented and reviewed (PV)1  (In-House)         3017F  Colorectal CA screen results documented and reviewed (PV)  (In-House)           Lab Orders:       APPTO  Appointment need  (In-House)           Procedures Ordered:         Annual wellness visit, includes a PPPS, subsequent visit  (In-House)           Other Orders:       1101F  Pt screen for fall risk; document no falls in past year or only 1 fall w/o injury in past year ([facility])  (In-House)           Calculated BMI above the upper parameter and a follow-up plan was documented in the medical record  (In-House)                   PLAN:          Health checkup She is UTD on colonoscopy, last done 1/2009 and this was normal.  Pap smears are no longer indicated by age and history.  She is UTD on mammogram, last done 6/2018 and this was normal.  She is UTD on DEXA, last done 8/2018 and this showed unchanged osteoporosis.  She is on alendronate and wants to continue on that for now.  She is UTD on Pneumovax (11/2016), Prevnar (11/2015), Td (2011) and flu (10/2017).  She is due for One Block Off the Grid (1BOG)d Havrix; recommend she get these at the pharmacy or Health Dept.  She is UTD on labs.  No fall risk, no memory issues, no signs/symptoms of depression.  She lives alone.  She is able to drive and perform ADLs/manage finances independently.  Hearing is adequate.  She has a living will and preventive services handout and safety handout were given to  her.  Current doctor list updated.  RTC 6 months.     MIPS PHQ-9 Depression Screening: Completed form scanned and in chart; Total Score 7     MAMMOGRAM: Done within last 2 years and results in are chart     COLORECTAL CANCER SCREENING: Results are in chart     BMI Elevated - Follow-Up Plan: She was provided education on weight loss strategies     FOLLOW-UP: Schedule a follow-up visit in 6 months..  f/u HTN with [place]           Orders:         Annual wellness visit, includes a PPPS, subsequent visit  (In-House)         1101F  Pt screen for fall risk; document no falls in past year or only 1 fall w/o injury in past year (ALMA)  (In-House)         3014F  Screening mammography results documented and reviewed (PV)1  (In-House)         3017F  Colorectal CA screen results documented and reviewed (PV)  (In-House)           Calculated BMI above the upper parameter and a follow-up plan was documented in the medical record  (In-House)         APPTO  Appointment need  (In-House)             Patient Education Handouts:       Physical Exam 60+ year, Female              Other Prescriptions:       Refill of: Venlafaxine HCl 75mg Capsules, Extended Release 1 cap daily  #90 (Ninety) capsule(s) Refills: 1       Refill of: Venlafaxine HCl 150mg Capsules, Extended Release Take 1 capsule(s) by mouth daily  #90 (Ninety) capsule(s) Refills: 1       Refill of: Trazodone HCl 100mg Tablet 1/2 to 1 tab HS prn  #45 (Forty Five) tablet(s) Refills: 1       Refill of: Alendronate Sodium 70mg Tablet Take 1 tablet(s) by mouth q week  #12 (Twelve) tablet(s) Refills: 3       Refill of: Lisinopril/Hydrochlorothiazide 10mg/12.5mg Tablet one a day  #90 (Ninety) tablet(s) Refills: 1       Refill of: Pravastatin 20mg Tablet 1 tab q day hs  #90 (Ninety) tablet(s) Refills: 1       Refill of: Synthroid (Levothyroxine Sodium) 0.088mg Tablet Take 1 tablet(s) by mouth daily  #90 (Ninety) tablet(s) Refills: 1         Patient Recommendations:        For   Health checkup:     Schedule a follow-up visit in 6 months.                APPOINTMENT INFORMATION:        Monday Tuesday Wednesday Thursday Friday Saturday Sunday            Time:___________________AM  PM   Date:_____________________             CHARGE CAPTURE           **Please note: ICD descriptions below are intended for billing purposes only and may not represent clinical diagnoses**        Primary Diagnosis:         V70.0 Health checkup            Z00.00    Encounter for general adult medical examination without abnormal findings              Orders:             Annual wellness visit, includes a PPPS, subsequent visit  (In-House)             1101F   Pt screen for fall risk; document no falls in past year or only 1 fall w/o injury in past year (ALMA)  (In-House)             3014F   Screening mammography results documented and reviewed (PV)1  (In-House)             3017F   Colorectal CA screen results documented and reviewed (PV)  (In-House)                Calculated BMI above the upper parameter and a follow-up plan was documented in the medical record  (In-House)             APPTO   Appointment need  (In-House)           V79.0 Screening for depression            Z13.89    Encounter for screening for other disorder

## 2021-05-21 ENCOUNTER — HOSPITAL ENCOUNTER (OUTPATIENT)
Dept: GENERAL RADIOLOGY | Facility: HOSPITAL | Age: 76
Discharge: HOME | End: 2021-05-21
Attending: FAMILY MEDICINE

## 2021-05-21 LAB
ALBUMIN SERPL-MCNC: 4.1 G/DL (ref 3.5–5)
ALBUMIN/GLOB SERPL: 1.3 {RATIO} (ref 1.4–2.6)
ALP SERPL-CCNC: 76 U/L (ref 43–160)
ALT SERPL-CCNC: 10 U/L (ref 10–40)
ANION GAP SERPL CALC-SCNC: 13 MMOL/L (ref 8–19)
AST SERPL-CCNC: 16 U/L (ref 15–50)
BILIRUB SERPL-MCNC: 0.4 MG/DL (ref 0.2–1.3)
BUN SERPL-MCNC: 13 MG/DL (ref 5–25)
BUN/CREAT SERPL: 17 {RATIO} (ref 6–20)
CALCIUM SERPL-MCNC: 9.9 MG/DL (ref 8.7–10.4)
CHLORIDE SERPL-SCNC: 101 MMOL/L (ref 99–111)
CHOLEST SERPL-MCNC: 217 MG/DL (ref 107–200)
CHOLEST/HDLC SERPL: 3.1 {RATIO} (ref 3–6)
CONV CO2: 29 MMOL/L (ref 22–32)
CONV TOTAL PROTEIN: 7.3 G/DL (ref 6.3–8.2)
CREAT UR-MCNC: 0.75 MG/DL (ref 0.5–0.9)
GFR SERPLBLD BASED ON 1.73 SQ M-ARVRAT: >60 ML/MIN/{1.73_M2}
GLOBULIN UR ELPH-MCNC: 3.2 G/DL (ref 2–3.5)
GLUCOSE SERPL-MCNC: 92 MG/DL (ref 65–99)
HDLC SERPL-MCNC: 70 MG/DL (ref 40–60)
LDLC SERPL CALC-MCNC: 131 MG/DL (ref 70–100)
OSMOLALITY SERPL CALC.SUM OF ELEC: 288 MOSM/KG (ref 273–304)
POTASSIUM SERPL-SCNC: 3.9 MMOL/L (ref 3.5–5.3)
SODIUM SERPL-SCNC: 139 MMOL/L (ref 135–147)
TRIGL SERPL-MCNC: 82 MG/DL (ref 40–150)
TSH SERPL-ACNC: 0.59 M[IU]/L (ref 0.27–4.2)
VLDLC SERPL-MCNC: 16 MG/DL (ref 5–37)

## 2021-06-01 ENCOUNTER — TRANSCRIBE ORDERS (OUTPATIENT)
Dept: FAMILY MEDICINE CLINIC | Age: 76
End: 2021-06-01

## 2021-06-01 DIAGNOSIS — R92.8 ABNORMAL MAMMOGRAM: Primary | ICD-10-CM

## 2021-06-05 NOTE — PROGRESS NOTES
"Lena White A  1945     Office/Outpatient Visit    Visit Date: Tue, May 18, 2021 01:56 pm    Provider: Chery Wright MD (Assistant: Shreya Naylor MA)    Location: [facility]        Electronically signed by Chery Wright MD on  05/18/2021 03:00:07 PM                             Subjective:        CC: Ms. White is a 75 year old White female.  Patient presents today for follow up visit (not taking Dicyclomine);         HPI: Lena is here today for routine follow-up of chronic issues.  She is not planning to get her COVID vaccination.        She is on lisinopril/HCTZ for HTN.  BP has been well controlled.  No CP, palpitations, or SOB.  She is no longer taking pravastatin for cholesterol.  She stopped due to myalgias.          She is on Synthroid for hypothyroidism.  No heat/cold intolerance, no changes in hair or skin.        She is on venlafaxine for depression.  She is on trazodone for sleep.  Mood has been \"good.\"  No depressed mood or anhedonia.  No anxiety or panic attacks.          She is on alendronate for osteoporosis.  Last DEXA was 9/2020 and this showed improved osteopenia..    ROS:     CONSTITUTIONAL:  Negative for fatigue and fever.      EYES:  Negative for blurred vision.      E/N/T:  Negative for diminished hearing and nasal congestion.      CARDIOVASCULAR:  Negative for chest pain and palpitations.      RESPIRATORY:  Negative for recent cough and dyspnea.      GASTROINTESTINAL:  Positive for abdominal pain ( diffuse ).   Negative for constipation, diarrhea, nausea or vomiting.      MUSCULOSKELETAL:  Positive for arthralgias.   Negative for myalgias.      NEUROLOGICAL:  Negative for paresthesias and weakness.      PSYCHIATRIC:  Negative for anxiety, depression and sleep disturbance.          Past Medical History / Family History / Social History:         Last Reviewed on 5/18/2021 02:43 PM by Chery Wright    Past Medical History:             PAST MEDICAL HISTORY         " Positive for    Hyperlipidemia and    Hypertension;     Positive for    Hypothyroidism;     Positive for    Skin cancer: Squamous  and Basil cell; ;         CURRENT MEDICAL PROVIDERS:    Dermatologist: Dr. Padilla    General Surgeon: Dr. Coker             ADVANCED DIRECTIVES: None         PREVENTIVE HEALTH MAINTENANCE             BONE DENSITY: was last done 9/15/2020 with the following abnormality noted-- osteopenia (improved) on alendronate     COLORECTAL CANCER SCREENING: Up to date (colonoscopy q10y; sigmoidoscopy q5y; Cologuard q3y) was last done 9/9/2020, Results are in chart; FIT Hemocult performed: 9/9/20 NEGATIVE     MAMMOGRAM: Done within last 2 years and results in are chart was last done 9/15/2020 with the following abnormalities noted-- L breast calcifications - dxtic mammo and U/S BIRADS-3 probably benign The next one is due  L breast diagnostic mammo 6 months     PAP SMEAR: but the results are unknown No longer indicated due to age and history s/p hyst         PAST MEDICAL HISTORY             ADVANCE DIRECTIVE Living will she just keeps it at home         PAST MEDICAL HISTORY             ADVANCE DIRECTIVE Living will copy requested ./mlb         Surgical History:         Cholecystectomy    Hernia Repair: ventral hernia repair 11/2016;     Hysterectomy    Joint Replacement: 7/2018 - bilateral knee replacement;     Tonsillectomy     Splenectomy     Hernia Repair: incisional; x2;     Exploratory Laparotomy: for hemoperitoneum;         Family History:     Father: Lung Cancer     Mother: Sarcoidosis         Social History:     Occupation:    Retired     Marital Status: Single         Tobacco/Alcohol/Supplements:     Last Reviewed on 5/18/2021 02:43 PM by Chery Wright    Tobacco: She has never smoked.          Substance Abuse History:     Last Reviewed on 5/18/2021 02:43 PM by Chery Wright        Mental Health History:     Last Reviewed on 5/18/2021 02:43 PM by Chery Wright        Communicable  Diseases (eg STDs):     Last Reviewed on 5/18/2021 02:43 PM by Chery Wright        Current Problems:     Last Reviewed on 9/01/2020 10:52 AM by Chery Wright    Hypothyroidism, unspecified    HLD - Mixed hyperlipidemia    Depression - Major depressive disorder, single episode, mild    HTN - Essential (primary) hypertension    Pain in left hand    Acquired absence of spleen    SCC - Other specified malignant neoplasm of skin, unspecified    Actinic keratosis    Other osteoporosis without current pathological fracture    Primary osteoarthritis, right hand    Primary osteoarthritis, left hand    Ventral hernia without obstruction or gangrene    Anxiety disorder, unspecified    Encounter for other screening for malignant neoplasm of breast    Asymptomatic menopausal state    Generalized abdominal tenderness    Encounter for general adult medical examination without abnormal findings    Encounter for screening for malignant neoplasm of colon    Encounter for screening mammogram for malignant neoplasm of breast        Immunizations:     Prevnar 13 (Pneumococcal PCV 13) 9/29/2019    zzPrevnar-13 11/4/2015    Fluzone (3 + years dose) 10/18/2018    Fluad pf 10/4/2017    Fluzone High-Dose pf (>=65 yr) 11/4/2015    Fluzone High-Dose pf (>=65 yr) 9/22/2016    Influenza High-Dose Virus Vaccine pf (>=65 yr) 9/29/2019    PNEUMOVAX 23 (Pneumococcal PPV23) 11/22/2016    Shingrix (Zoster recombinant) 10/18/2018        Allergies:     Last Reviewed on 5/18/2021 01:59 PM by Shreya Naylor    Ambien:      Hyaluronic Acid:      Methylprednisolone:          Current Medications:     Last Reviewed on 5/18/2021 01:59 PM by Shreya Naylor    PreserVision Areds 2 2 tabs qd     lisinopriL-hydrochlorothiazide 10-12.5 mg oral tablet [TAKE 1 TABLET BY MOUTH EVERY DAY]    venlafaxine 150 mg oral Capsule, Extended Release 24 hr [TAKE 1 CAPSULE BY MOUTH EVERY DAY]    venlafaxine 75 mg oral Capsule, Extended Release 24 hr [TAKE 1 CAPSULE BY  MOUTH EVERY DAY]    alendronate 70 mg oral tablet [Take 1 tablet(s) by mouth q week]    traZODone 100 mg oral tablet [TAKE 1/2 TO 1 TABLET BY MOUTH EVERY DAY AT BEDTIME AS NEEDED]    levothyroxine 88 mcg oral tablet [TAKE 1 TABLET BY MOUTH EVERY DAY]    OTC Calcium 600mg w/ Vitamin D3 800IU Take one tablet once daily      Voltaren  1% Topical Gel [Apply to affected area BID]    dicyclomine 10 mg oral capsule [take 1 capsule (10 mg) by oral route 2 times per day prn abdominal pain]        Objective:        Vitals:         Current: 5/18/2021 2:00:57 PM    Ht:  5 ft, 0.75 in;  Wt: 133.6 lbs;  BMI: 25.5T: 97.4 F (temporal);  BP: 111/70 mm Hg (left arm, sitting);  P: 69 bpm (left arm (BP Cuff), sitting);  sCr: 0.77 mg/dL;  GFR: 56.17        Exams:     PHYSICAL EXAM:     GENERAL: vital signs recorded - well developed, well nourished;  well groomed;  no apparent distress;     EYES: extraocular movements intact; conjunctiva and cornea are normal; PERRLA;     RESPIRATORY: normal respiratory rate and pattern with no distress; normal breath sounds with no rales, rhonchi, wheezes or rubs;     CARDIOVASCULAR: normal rate; rhythm is regular;  no systolic murmur; no edema;     GASTROINTESTINAL: mild LUQ tenderness;  no guarding;  no rebound tenderness;  normal bowel sounds;     MUSCULOSKELETAL: normal gait; normal overall tone     PSYCHIATRIC: appropriate affect and demeanor; normal psychomotor function; normal speech pattern;         Assessment:         I10   HTN - Essential (primary) hypertension       E78.2   HLD - Mixed hyperlipidemia       E03.9   Hypothyroidism, unspecified       F32.0   Depression - Major depressive disorder, single episode, mild       Z90.81   Acquired absence of spleen           ORDERS:         Meds Prescribed:       [Refilled] traZODone 100 mg oral tablet [TAKE 1/2 TO 1 TABLET BY MOUTH EVERY DAY AT BEDTIME AS NEEDED], #45 (forty five) tablets, Refills: 1 (one)       [Refilled]  lisinopriL-hydrochlorothiazide 10-12.5 mg oral tablet [TAKE 1 TABLET BY MOUTH EVERY DAY], #90 (ninety) tablets, Refills: 1 (one)       [Refilled] levothyroxine 88 mcg oral tablet [TAKE 1 TABLET BY MOUTH EVERY DAY], #90 (ninety) tablets, Refills: 1 (one)       [Refilled] venlafaxine 150 mg oral Capsule, Extended Release 24 hr [TAKE 1 CAPSULE BY MOUTH EVERY DAY], #90 (ninety) capsules, Refills: 1 (one)       [Refilled] venlafaxine 75 mg oral Capsule, Extended Release 24 hr [TAKE 1 CAPSULE BY MOUTH EVERY DAY], #90 (ninety) capsules, Refills: 1 (one)         Radiology/Test Orders:       3017F  Colorectal CA screen results documented and reviewed (PV)  (In-House)              Lab Orders:       [id number]  TSH - H TSH  (Send-Out)            [id number]  HTNLP - [facility] CMP AND LIPID: [id number], [id number]  (Send-Out)            APPTO  Appointment need  (In-House)              Other Orders:         Screening mammogram results documented  (Send-Out)                      Plan:         HTN - Essential (primary) hypertensionBP at goal.  Refills sent.  Checking labs.  RTC 6 months for AWV.    LABORATORY:  Labs ordered to be performed today include HTN/Lipid Panel: CMP, Lipid.  MIPS Vaccines Flu and Pneumonia updated in Shot record Screening mammomgram done within last 2 years and results in are chart Colorectal Cancer Screening is up to date and the results are in the chart     FOLLOW-UP: Schedule a follow-up visit in 6 months.:.  Medicare wellness 30 with [place]          Prescriptions:       [Refilled] lisinopriL-hydrochlorothiazide 10-12.5 mg oral tablet [TAKE 1 TABLET BY MOUTH EVERY DAY], #90 (ninety) tablets, Refills: 1 (one)           Orders:       [id number]  HTNLP - H CMP AND LIPID: [id number], [id number]  (Send-Out)              Screening mammogram results documented  (Send-Out)            3017F  Colorectal CA screen results documented and reviewed (PV)  (In-House)            APPTO  Appointment need  (In-House)              HLD - Mixed  hyperlipidemiaChecking labs.        Hypothyroidism, unspecifiedStable.  Refills sent.  Checking labs.    LABORATORY:  Labs ordered to be performed today include TSH.            Prescriptions:       [Refilled] levothyroxine 88 mcg oral tablet [TAKE 1 TABLET BY MOUTH EVERY DAY], #90 (ninety) tablets, Refills: 1 (one)           Orders:       [id number]  TSH - [facility] TSH  (Send-Out)              Depression - Major depressive disorder, single episode, mildOK to try weaning down to 150 mg Effexor since the price went up on the two different tabs.  Refills sent.          Prescriptions:       [Refilled] traZODone 100 mg oral tablet [TAKE 1/2 TO 1 TABLET BY MOUTH EVERY DAY AT BEDTIME AS NEEDED], #45 (forty five) tablets, Refills: 1 (one)       [Refilled] venlafaxine 150 mg oral Capsule, Extended Release 24 hr [TAKE 1 CAPSULE BY MOUTH EVERY DAY], #90 (ninety) capsules, Refills: 1 (one)       [Refilled] venlafaxine 75 mg oral Capsule, Extended Release 24 hr [TAKE 1 CAPSULE BY MOUTH EVERY DAY], #90 (ninety) capsules, Refills: 1 (one)         Acquired absence of spleenVaccines reviewed.              Patient Recommendations:        For  HTN - Essential (primary) hypertension:    Schedule a follow-up visit in 6 months.                APPOINTMENT INFORMATION:        Monday Tuesday Wednesday Thursday Friday Saturday Sunday            Time:___________________AM  PM   Date:_____________________             Charge Capture:         Primary Diagnosis:     I10  HTN - Essential (primary) hypertension           Orders:      [id number]  Office/outpatient visit; established patient, level 4  (In-House)            3017F  Colorectal CA screen results documented and reviewed (PV)  (In-House)            APPTO  Appointment need  (In-House)              E78.2  HLD - Mixed hyperlipidemia     E03.9  Hypothyroidism, unspecified     F32.0  Depression - Major depressive disorder, single episode, mild     Z90.81  Acquired absence of spleen

## 2021-06-14 ENCOUNTER — HOSPITAL ENCOUNTER (OUTPATIENT)
Dept: MAMMOGRAPHY | Facility: HOSPITAL | Age: 76
Discharge: HOME | End: 2021-06-14

## 2021-06-14 ENCOUNTER — HOSPITAL ENCOUNTER (OUTPATIENT)
Dept: ULTRASOUND IMAGING | Facility: HOSPITAL | Age: 76
End: 2021-06-14

## 2021-06-14 DIAGNOSIS — R92.8 ABNORMAL MAMMOGRAM: ICD-10-CM

## 2021-06-16 ENCOUNTER — TELEPHONE (OUTPATIENT)
Dept: FAMILY MEDICINE CLINIC | Age: 76
End: 2021-06-16

## 2021-06-16 DIAGNOSIS — R92.8 ABNORMAL MAMMOGRAM OF LEFT BREAST: Primary | ICD-10-CM

## 2021-06-24 ENCOUNTER — TELEPHONE (OUTPATIENT)
Dept: FAMILY MEDICINE CLINIC | Age: 76
End: 2021-06-24

## 2021-06-24 DIAGNOSIS — R92.8 ABNORMAL MAMMOGRAM OF LEFT BREAST: Primary | ICD-10-CM

## 2021-06-24 NOTE — TELEPHONE ENCOUNTER
Spoke with pt and yes she does want it for August. States was scheduled for the first week in August when she was in office

## 2021-06-24 NOTE — TELEPHONE ENCOUNTER
Please let Lena know that I have put in the order for her stereotactic left breast biopsy as discussed with her by the radiologist at [facility] at the time of her diagnostic mammogram. I understand that she would like this to be scheduled for August, so that is what I have passed on to our schedulers. I would just like to be sure that this is accurate. Please let me know if she has any other concerns or questions. Thanks, LINDA

## 2021-07-01 VITALS
BODY MASS INDEX: 25.34 KG/M2 | TEMPERATURE: 98.1 F | WEIGHT: 134.2 LBS | DIASTOLIC BLOOD PRESSURE: 75 MMHG | HEART RATE: 69 BPM | SYSTOLIC BLOOD PRESSURE: 111 MMHG | HEIGHT: 61 IN

## 2021-07-01 VITALS
HEIGHT: 61 IN | WEIGHT: 137.6 LBS | TEMPERATURE: 97.5 F | BODY MASS INDEX: 25.98 KG/M2 | SYSTOLIC BLOOD PRESSURE: 121 MMHG | DIASTOLIC BLOOD PRESSURE: 60 MMHG | HEART RATE: 79 BPM

## 2021-07-01 VITALS
WEIGHT: 132.7 LBS | HEART RATE: 83 BPM | DIASTOLIC BLOOD PRESSURE: 70 MMHG | BODY MASS INDEX: 25.05 KG/M2 | TEMPERATURE: 97.9 F | HEIGHT: 61 IN | SYSTOLIC BLOOD PRESSURE: 120 MMHG

## 2021-07-01 VITALS
TEMPERATURE: 98.9 F | SYSTOLIC BLOOD PRESSURE: 126 MMHG | BODY MASS INDEX: 25.07 KG/M2 | DIASTOLIC BLOOD PRESSURE: 80 MMHG | WEIGHT: 132.8 LBS | HEART RATE: 74 BPM | HEIGHT: 61 IN

## 2021-07-02 VITALS
BODY MASS INDEX: 25.22 KG/M2 | SYSTOLIC BLOOD PRESSURE: 111 MMHG | WEIGHT: 133.6 LBS | HEIGHT: 61 IN | HEART RATE: 69 BPM | TEMPERATURE: 97.4 F | DIASTOLIC BLOOD PRESSURE: 70 MMHG

## 2021-07-02 VITALS
TEMPERATURE: 98 F | HEIGHT: 61 IN | WEIGHT: 134.2 LBS | DIASTOLIC BLOOD PRESSURE: 73 MMHG | SYSTOLIC BLOOD PRESSURE: 116 MMHG | HEART RATE: 76 BPM | BODY MASS INDEX: 25.34 KG/M2

## 2021-07-02 VITALS
DIASTOLIC BLOOD PRESSURE: 74 MMHG | HEART RATE: 79 BPM | TEMPERATURE: 98.4 F | BODY MASS INDEX: 26.51 KG/M2 | SYSTOLIC BLOOD PRESSURE: 104 MMHG | WEIGHT: 140.4 LBS | HEIGHT: 61 IN

## 2021-07-27 ENCOUNTER — TELEPHONE (OUTPATIENT)
Dept: FAMILY MEDICINE CLINIC | Age: 76
End: 2021-07-27

## 2021-07-27 NOTE — TELEPHONE ENCOUNTER
Caller: Lena White    Relationship: Self    Best call back number:  [phone]     What was the call regarding: CALLER IS REQUESTING A COVID TEST. SHE IS ASYMPTOMATIC. PATIENT IS  A PATIENT IN THE [place] OFFICE BUT IS INQUIRING IF SHE IS ABLE TO COME TO THE LAB AT THIS LOCATION TO PREFORM A COVID TEST. SHE WAS EXPOSED ON 07/17 AND NEEDS THE TEST ASAP.     PLEASE ADVISE .     WARM TRANSFER TO OFFICE FAILED     Do you require a callback: YES

## 2021-08-10 RX ORDER — ALENDRONATE SODIUM 70 MG/1
TABLET ORAL
Qty: 12 TABLET | Refills: 3 | Status: SHIPPED | OUTPATIENT
Start: 2021-08-10 | End: 2022-05-19 | Stop reason: SDUPTHER

## 2021-11-18 ENCOUNTER — OFFICE VISIT (OUTPATIENT)
Dept: FAMILY MEDICINE CLINIC | Age: 76
End: 2021-11-18

## 2021-11-18 VITALS
HEIGHT: 61 IN | TEMPERATURE: 96.9 F | HEART RATE: 65 BPM | WEIGHT: 134.4 LBS | BODY MASS INDEX: 25.37 KG/M2 | DIASTOLIC BLOOD PRESSURE: 80 MMHG | SYSTOLIC BLOOD PRESSURE: 121 MMHG

## 2021-11-18 DIAGNOSIS — E03.9 ACQUIRED HYPOTHYROIDISM: ICD-10-CM

## 2021-11-18 DIAGNOSIS — I10 PRIMARY HYPERTENSION: ICD-10-CM

## 2021-11-18 DIAGNOSIS — Z23 ENCOUNTER FOR IMMUNIZATION: ICD-10-CM

## 2021-11-18 DIAGNOSIS — F33.1 MAJOR DEPRESSIVE DISORDER, RECURRENT EPISODE, MODERATE DEGREE (HCC): ICD-10-CM

## 2021-11-18 DIAGNOSIS — R41.3 MEMORY LOSS: ICD-10-CM

## 2021-11-18 DIAGNOSIS — Z00.00 ANNUAL PHYSICAL EXAM: Primary | ICD-10-CM

## 2021-11-18 PROBLEM — E78.2 MIXED HYPERLIPIDEMIA: Status: ACTIVE | Noted: 2021-11-18

## 2021-11-18 PROBLEM — I45.10 RBBB: Status: ACTIVE | Noted: 2021-11-18

## 2021-11-18 PROBLEM — J30.2 SEASONAL ALLERGIC RHINITIS: Status: ACTIVE | Noted: 2021-11-18

## 2021-11-18 PROBLEM — F41.9 ANXIETY: Status: ACTIVE | Noted: 2021-11-18

## 2021-11-18 PROBLEM — K43.9 VENTRAL HERNIA WITHOUT OBSTRUCTION OR GANGRENE: Status: ACTIVE | Noted: 2021-11-18

## 2021-11-18 PROBLEM — M81.0 AGE-RELATED OSTEOPOROSIS WITHOUT CURRENT PATHOLOGICAL FRACTURE: Status: ACTIVE | Noted: 2021-11-18

## 2021-11-18 PROBLEM — Z90.81 ACQUIRED ABSENCE OF SPLEEN: Status: ACTIVE | Noted: 2021-11-18

## 2021-11-18 PROCEDURE — G0008 ADMIN INFLUENZA VIRUS VAC: HCPCS | Performed by: FAMILY MEDICINE

## 2021-11-18 PROCEDURE — 1170F FXNL STATUS ASSESSED: CPT | Performed by: FAMILY MEDICINE

## 2021-11-18 PROCEDURE — 90662 IIV NO PRSV INCREASED AG IM: CPT | Performed by: FAMILY MEDICINE

## 2021-11-18 PROCEDURE — 99213 OFFICE O/P EST LOW 20 MIN: CPT | Performed by: FAMILY MEDICINE

## 2021-11-18 PROCEDURE — G0439 PPPS, SUBSEQ VISIT: HCPCS | Performed by: FAMILY MEDICINE

## 2021-11-18 PROCEDURE — 1159F MED LIST DOCD IN RCRD: CPT | Performed by: FAMILY MEDICINE

## 2021-11-18 RX ORDER — LEVOTHYROXINE SODIUM 88 UG/1
88 TABLET ORAL DAILY
COMMUNITY
Start: 2021-09-18 | End: 2021-12-30

## 2021-11-18 RX ORDER — LISINOPRIL AND HYDROCHLOROTHIAZIDE 12.5; 1 MG/1; MG/1
1 TABLET ORAL DAILY
COMMUNITY
Start: 2021-09-18 | End: 2021-12-10

## 2021-11-18 RX ORDER — VENLAFAXINE HYDROCHLORIDE 150 MG/1
150 CAPSULE, EXTENDED RELEASE ORAL DAILY
COMMUNITY
Start: 2021-10-13 | End: 2022-01-13

## 2021-11-18 RX ORDER — TRAZODONE HYDROCHLORIDE 150 MG/1
0.5 TABLET ORAL NIGHTLY
COMMUNITY
End: 2022-03-22 | Stop reason: SDUPTHER

## 2021-11-18 RX ORDER — VENLAFAXINE HYDROCHLORIDE 75 MG/1
75 CAPSULE, EXTENDED RELEASE ORAL DAILY
COMMUNITY
Start: 2021-11-05 | End: 2022-02-08

## 2021-11-18 NOTE — ASSESSMENT & PLAN NOTE
Partially controlled but stable.  She feels that the Effexor is controlling her symptoms decently to 25 mg and does not want to decrease after experiencing that episode of confusion in  town.  No refills needed.

## 2021-11-18 NOTE — PROGRESS NOTES
The ABCs of the Annual Wellness Visit  Subsequent Medicare Wellness Visit    Chief Complaint   Patient presents with   • Medicare Wellness-subsequent      Subjective    History of Present Illness:  Lena White is a 75 y.o. female who presents for a Subsequent Medicare Wellness Visit.    She is reportedly UTD on colon cancer screening; reports Dr. Coker recommended against colonoscopy when she saw him last year and instead ordered hemoccult screening.  Pap smear is no longer indicated by age and history.  She is UTD on mammogram, last done 8/2021 and this was normal.  She is UTD on DEXA, last done 9/2020 and this showed osteoporosis.  Currently on Fosamax.  She is UTD on Pneumovax (11/2016), Prevnar (11/2015), Zostavax (8/2017), Shingrix (2018).  She is due for COVID, Td and flu.  She is due for routine labs including CMP and TSH.     At her last visit, we had discussed whether she wanted to try going down off the 75 mg dose.  On the third day, she had an episode of confusion when she was driving to [facility] that she is able to recall in perfect detail.  Eventually it resolved itself gradually only without her taking any action.  She has restarted the Effexor and has not noticed any problems with it since then.  She does have some general memory issues and worries considerably about that.  She has left her heating pad on overnight or puts groceries away in the wrong location, placing things in the fridge that belong in the cupboard and vice versa.    The following portions of the patient's history were reviewed and   updated as appropriate: allergies, current medications, past family history, past medical history, past social history, past surgical history and problem list.    Compared to one year ago, the patient feels her physical   health is worse.    Compared to one year ago, the patient feels her mental   health is worse.    Recent Hospitalizations:  She was not admitted to the hospital during the last year.        Current Medical Providers:  Patient Care Team:  Chery Wright MD as PCP - General (Family Medicine)    Outpatient Medications Prior to Visit   Medication Sig Dispense Refill   • alendronate (FOSAMAX) 70 MG tablet TAKE 1 TABLET BY MOUTH EVERY WEEK 12 tablet 3   • levothyroxine (SYNTHROID, LEVOTHROID) 88 MCG tablet Take 88 mcg by mouth Daily.     • lisinopril-hydrochlorothiazide (PRINZIDE,ZESTORETIC) 10-12.5 MG per tablet Take 1 tablet by mouth Daily.     • traZODone (DESYREL) 150 MG tablet Take 1 tablet by mouth Daily.     • venlafaxine XR (EFFEXOR-XR) 150 MG 24 hr capsule Take 150 mg by mouth Daily.     • venlafaxine XR (EFFEXOR-XR) 75 MG 24 hr capsule Take 75 mg by mouth Daily.       No facility-administered medications prior to visit.       No opioid medication identified on active medication list. I have reviewed chart for other potential  high risk medication/s and harmful drug interactions in the elderly.          Aspirin is not on active medication list.  Aspirin use is not indicated based on review of current medical condition/s. Risk of harm outweighs potential benefits.  .    Patient Active Problem List   Diagnosis   • Acquired hypothyroidism   • Mixed hyperlipidemia   • Major depressive disorder, recurrent episode, moderate degree (HCC)   • Primary hypertension   • Acquired absence of spleen   • Age-related osteoporosis without current pathological fracture   • Anxiety   • Ventral hernia without obstruction or gangrene   • Annual physical exam   • Memory loss   • Cervical radiculopathy   • RBBB   • Seasonal allergic rhinitis     Advance Care Planning  Advance Directive is not on file.  ACP discussion was held with the patient during this visit. Patient does not have an advance directive, information provided.    Review of Systems   Constitutional: Negative for chills, fatigue and fever.   HENT: Negative for congestion, hearing loss and rhinorrhea.    Eyes: Negative for pain and visual  "disturbance.   Respiratory: Negative for cough and shortness of breath.    Cardiovascular: Negative for chest pain and palpitations.   Gastrointestinal: Negative for abdominal pain, constipation, diarrhea, nausea and vomiting.   Genitourinary: Negative for difficulty urinating and dysuria.   Musculoskeletal: Positive for arthralgias (hand pain). Negative for myalgias.   Neurological: Negative for weakness and numbness.   Psychiatric/Behavioral: Negative for dysphoric mood and sleep disturbance. The patient is not nervous/anxious.         Objective    Vitals:    11/18/21 1105   BP: 121/80   BP Location: Left arm   Patient Position: Sitting   Pulse: 65   Temp: 96.9 °F (36.1 °C)   TempSrc: Oral   Weight: 61 kg (134 lb 6.4 oz)   Height: 154.3 cm (60.75\")     BMI Readings from Last 1 Encounters:   11/18/21 25.60 kg/m²   BMI is above normal parameters. Recommendations include: exercise counseling and nutrition counseling    Does the patient have evidence of cognitive impairment? No    Physical Exam  Vitals reviewed.   Constitutional:       General: She is not in acute distress.     Appearance: Normal appearance. She is well-developed.   HENT:      Head: Normocephalic and atraumatic.      Right Ear: External ear normal.      Left Ear: External ear normal.      Mouth/Throat:      Mouth: Mucous membranes are moist.   Eyes:      Extraocular Movements: Extraocular movements intact.      Conjunctiva/sclera: Conjunctivae normal.      Pupils: Pupils are equal, round, and reactive to light.   Cardiovascular:      Rate and Rhythm: Normal rate and regular rhythm.      Heart sounds: No murmur heard.      Pulmonary:      Effort: Pulmonary effort is normal.      Breath sounds: Normal breath sounds. No wheezing, rhonchi or rales.   Abdominal:      General: Bowel sounds are normal. There is no distension.      Palpations: Abdomen is soft.      Tenderness: There is no abdominal tenderness.   Musculoskeletal:         General: Normal range " of motion.   Skin:     General: Skin is warm and dry.   Neurological:      Mental Status: She is alert and oriented to person, place, and time.      Deep Tendon Reflexes: Reflexes normal.   Psychiatric:         Mood and Affect: Mood and affect normal.         Behavior: Behavior normal.         Thought Content: Thought content normal.         Judgment: Judgment normal.                 HEALTH RISK ASSESSMENT    Smoking Status:  Social History     Tobacco Use   Smoking Status Never Smoker   Smokeless Tobacco Never Used     Alcohol Consumption:  Social History     Substance and Sexual Activity   Alcohol Use None     Fall Risk Screen:    STEADI Fall Risk Assessment was completed, and patient is at LOW risk for falls.Assessment completed on:11/18/2021    Depression Screening:  PHQ-2/PHQ-9 Depression Screening 11/18/2021   Little interest or pleasure in doing things 0   Feeling down, depressed, or hopeless 0   Total Score 0       Health Habits and Functional and Cognitive Screening:  Functional & Cognitive Status 11/18/2021   Do you have difficulty preparing food and eating? No   Do you have difficulty bathing yourself, getting dressed or grooming yourself? No   Do you have difficulty using the toilet? No   Do you have difficulty moving around from place to place? No   Do you have trouble with steps or getting out of a bed or a chair? No   Current Diet Well Balanced Diet   Dental Exam Up to date   Eye Exam Up to date   Exercise (times per week) 3 times per week   Current Exercises Include Walking   Do you need help using the phone?  No   Are you deaf or do you have serious difficulty hearing?  No   Do you need help with transportation? No   Do you need help shopping? No   Do you need help preparing meals?  No   Do you need help with housework?  No   Do you need help with laundry? No   Do you need help taking your medications? No   Do you need help managing money? No   Do you ever drive or ride in a car without wearing a  seat belt? No   Have you felt unusual stress, anger or loneliness in the last month? No   Who do you live with? Alone   If you need help, do you have trouble finding someone available to you? No   Have you been bothered in the last four weeks by sexual problems? No   Do you have difficulty concentrating, remembering or making decisions? Yes       Age-appropriate Screening Schedule:  Refer to the list below for future screening recommendations based on patient's age, sex and/or medical conditions. Orders for these recommended tests are listed in the plan section. The patient has been provided with a written plan.    Health Maintenance   Topic Date Due   • TDAP/TD VACCINES (1 - Tdap) Never done   • ZOSTER VACCINE (2 of 3) 09/28/2017   • LIPID PANEL  05/21/2022   • MAMMOGRAM  08/11/2022   • DXA SCAN  09/15/2022   • INFLUENZA VACCINE  Completed              Assessment/Plan   CMS Preventative Services Quick Reference  Risk Factors Identified During Encounter  Immunizations Discussed/Encouraged (specific Immunizations; Influenza and COVID19  The above risks/problems have been discussed with the patient.  Follow up actions/plans if indicated are seen below in the Assessment/Plan Section.  Pertinent information has been shared with the patient in the After Visit Summary.    Diagnoses and all orders for this visit:    1. Annual physical exam (Primary)  Assessment & Plan:  She is reportedly UTD on colon cancer screening; reports Dr. Coker recommended against colonoscopy when she saw him last year and instead ordered hemoccult screening.  Pap smear is no longer indicated by age and history.  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  She is UTD on DEXA, last done 9/2020 and this showed osteoporosis.  Currently on Fosamax.  She is UTD on Pneumovax (11/2016), Prevnar (11/2015), Zostavax (8/2017), Shingrix (2018).  " She is due for COVID, Td and flu.    High-dose flu given today.  She declines Covid out of concerns for \"inflammation.\"  Td can be done at the pharmacy.  She is due for routine labs including CMP and TSH; ordered.       2. Primary hypertension    3. Acquired hypothyroidism  Assessment & Plan:  Due for labs.  No refills needed.    Orders:  -     TSH; Future  -     Comprehensive Metabolic Panel; Future    4. Encounter for immunization  -     Fluzone High-Dose 65+yrs    5. Memory loss  Assessment & Plan:  She does have a lot of concerns for memory loss.  She puts groceries away in the wrong location (covered instead of fridge and vice versa, etc).  She forgets things frequently and also had that one spell of confusion when she was over in [place] [facility] per the HPI.  Offered Neuropsych eval but she would like to think more about this at this time.  She can call back at any time and I will be happy to set that up for her.      6. Major depressive disorder, recurrent episode, moderate degree (HCC)  Assessment & Plan:  Partially controlled but stable.  She feels that the Effexor is controlling her symptoms decently to 25 mg and does not want to decrease after experiencing that episode of confusion in [place] [facility].  No refills needed.        Follow Up:   Return in about 6 months (around 5/18/2022) for Recheck.     An After Visit Summary and PPPS were made available to the patient.                   "

## 2021-11-18 NOTE — ASSESSMENT & PLAN NOTE
She does have a lot of concerns for memory loss.  She puts groceries away in the wrong location (covered instead of fridge and vice versa, etc).  She forgets things frequently and also had that one spell of confusion [place]PI.  Offered Neuropsych eval but she would like to think more about this at this time.  She can call back at any time and I will be happy to set that up for her.

## 2021-11-18 NOTE — ASSESSMENT & PLAN NOTE
"She is reportedly UTD on colon cancer screening; reports Dr. Coker recommended against colonoscopy when she saw him last year and instead ordered hemoccult screening.  Pap smear is no longer indicated by age and history.  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  She is UTD on DEXA, last done 9/2020 and this showed osteoporosis.  Currently on Fosamax.  She is UTD on Pneumovax (11/2016), Prevnar (11/2015), Zostavax (8/2017), Shingrix (2018).  She is due for COVID, Td and flu.    High-dose flu given today.  She declines Covid out of concerns for \"inflammation.\"  Td can be done at the pharmacy.  She is due for routine labs including CMP and TSH; ordered.   "

## 2021-11-19 DIAGNOSIS — N60.92 ATYPICAL DUCTAL HYPERPLASIA OF LEFT BREAST: Primary | ICD-10-CM

## 2021-11-19 NOTE — PROGRESS NOTES
Biopsy came back showing focal atypical ductal hyperplasia of the L breast -- confirmed with [facility], they are sending an amended report.  Referral placed to Dr. Cady Dunne for evaluation and removal.  Thanks, LINDA

## 2021-11-25 PROBLEM — N60.99 ATYPICAL DUCTAL HYPERPLASIA OF BREAST: Status: RESOLVED | Noted: 2021-11-25 | Resolved: 2021-11-25

## 2021-11-25 PROBLEM — R92.8 ABNORMAL MAMMOGRAM: Status: ACTIVE | Noted: 2021-11-25

## 2021-11-25 PROBLEM — N60.99 ATYPICAL DUCTAL HYPERPLASIA OF BREAST: Status: ACTIVE | Noted: 2021-11-25

## 2021-11-25 NOTE — PROGRESS NOTES
Chief Complaint: Breast Mass    Subjective         History of Present Illness  Lena White is a 75 y.o. female presents to [facility] to be seen for recent breast biopsy that returned as atypical ductal hyperplasia that was found in a hylanized fibroadenoma per path report from [facility] ( initially misreported as typical).  According to mammogram there were 2 clusters of calcifications and only one set were biopsied and they are  by about 1 cm.      Media Information             File Link    Scan on 8/11/2021 by Chery Wright MD: PATH REPORT 8/11/21        Key Information    Document ID File Type Document Type Description   [id number] Image PATHOLOGY - SCAN PATH REPORT 8/11/21       Import Information    Attached At Date Time User Dept   Order Level 8/11/2021  Chery Wright MD        Order    Scanned Pathology [[id number]]       Encounter    Scanned Document on 8/11/21 with Chery Wright MD       SCANNED PATHOLOGY (08/11/2021)    SCANNED - IMAGING (06/21/2021)    Objective     Past Medical History:   Diagnosis Date   • Hyperlipidemia    • Hypertension    • Hypothyroidism    • Osteoporosis        Past Surgical History:   Procedure Laterality Date   • CHOLECYSTECTOMY     • EXPLORATORY LAPAROTOMY     • HYSTERECTOMY     • INCISIONAL HERNIA REPAIR      x2   • REPLACEMENT TOTAL KNEE Bilateral 07/2018   • SPLENECTOMY     • TONSILLECTOMY     • VENTRAL HERNIA REPAIR  11/2016         Current Outpatient Medications:   •  alendronate (FOSAMAX) 70 MG tablet, TAKE 1 TABLET BY MOUTH EVERY WEEK, Disp: 12 tablet, Rfl: 3  •  levothyroxine (SYNTHROID, LEVOTHROID) 88 MCG tablet, Take 88 mcg by mouth Daily., Disp: , Rfl:   •  lisinopril-hydrochlorothiazide (PRINZIDE,ZESTORETIC) 10-12.5 MG per tablet, Take 1 tablet by mouth Daily., Disp: , Rfl:   •  traZODone (DESYREL) 150 MG tablet, Take 1 tablet by mouth Daily., Disp: , Rfl:   •  venlafaxine XR (EFFEXOR-XR) 150  "MG 24 hr capsule, Take 150 mg by mouth Daily., Disp: , Rfl:   •  venlafaxine XR (EFFEXOR-XR) 75 MG 24 hr capsule, Take 75 mg by mouth Daily., Disp: , Rfl:     Allergies   Allergen Reactions   • Methylprednisolone Irritability   • Zolpidem Tartrate Irritability        Family History   Problem Relation Age of Onset   • Sarcoidosis Mother    • Lung cancer Father        Social History     Socioeconomic History   • Marital status:    Tobacco Use   • Smoking status: Never Smoker   • Smokeless tobacco: Never Used   Vaping Use   • Vaping Use: Never used       Vital Signs:   Resp 16   Ht 154.3 cm (60.75\")   Wt 61 kg (134 lb 7.7 oz)   BMI 25.62 kg/m²    Review of Systems    Physical Exam  Vitals and nursing note reviewed.   Constitutional:       Appearance: Normal appearance.   HENT:      Head: Normocephalic and atraumatic.   Eyes:      Extraocular Movements: Extraocular movements intact.      Pupils: Pupils are equal, round, and reactive to light.   Cardiovascular:      Pulses: Normal pulses.   Pulmonary:      Effort: Pulmonary effort is normal. No accessory muscle usage or respiratory distress.   Chest:   Breasts:      Right: Mass present. No inverted nipple, nipple discharge, skin change, axillary adenopathy or supraclavicular adenopathy.      Left: Normal. No inverted nipple, nipple discharge, skin change, axillary adenopathy or supraclavicular adenopathy.        Comments: Well healed left breast biopsy  Right breast with the upper outer quadrant far lateral feels to be a 2 cm mass or this could also just be an island of dense tissue.  Abdominal:      General: Abdomen is flat.      Palpations: Abdomen is soft.      Tenderness: There is no abdominal tenderness. There is no guarding.   Musculoskeletal:         General: No swelling, tenderness or deformity.      Cervical back: Neck supple.   Lymphadenopathy:      Upper Body:      Right upper body: No supraclavicular or axillary adenopathy.      Left upper body: No " supraclavicular or axillary adenopathy.   Skin:     General: Skin is warm and dry.   Neurological:      General: No focal deficit present.      Mental Status: She is alert and oriented to person, place, and time.   Psychiatric:         Mood and Affect: Mood normal.         Thought Content: Thought content normal.          Result Review :               Assessment and Plan    Diagnoses and all orders for this visit:    1. Abnormal mammogram (Primary)  -     US Breast Right Complete; Future    2. Atypical ductal hyperplasia of left breast  -     Case Request; Standing  -     Mammo Breast Placement Device Initial Without Biopsy Left; Future  -     Case Request  -     COVID PRE-OP / PRE-PROCEDURE SCREENING ORDER (NO ISOLATION) - Swab, Nasopharynx; Future    Pt has abnormal biopsy that on final amended pathology shows atypical ductal hyperplasia in a fibroadenoma.  On exam today I could feel a palpable area in the far upper outer quadrant of the right breast and this has not yet been examined her ultrasound so we will have that done prior to her surgery.      Follow Up   Return for Next scheduled followup after surgery.  Patient was given instructions and counseling regarding her condition or for health maintenance advice. Please see specific information pulled into the AVS if appropriate.         This document has been electronically signed by Cady Dunne MD  November 29, 2021 10:19 EST

## 2021-11-29 ENCOUNTER — OFFICE VISIT (OUTPATIENT)
Dept: SURGERY | Facility: CLINIC | Age: 76
End: 2021-11-29

## 2021-11-29 VITALS — WEIGHT: 134.48 LBS | BODY MASS INDEX: 25.39 KG/M2 | RESPIRATION RATE: 16 BRPM | HEIGHT: 61 IN

## 2021-11-29 DIAGNOSIS — R92.8 ABNORMAL MAMMOGRAM: Primary | ICD-10-CM

## 2021-11-29 DIAGNOSIS — N60.92 ATYPICAL DUCTAL HYPERPLASIA OF LEFT BREAST: ICD-10-CM

## 2021-11-29 DIAGNOSIS — N63.0 BREAST MASS: Primary | ICD-10-CM

## 2021-11-29 PROBLEM — N63.13 MASS OF LOWER OUTER QUADRANT OF RIGHT BREAST: Status: ACTIVE | Noted: 2021-11-29

## 2021-11-29 PROCEDURE — 99203 OFFICE O/P NEW LOW 30 MIN: CPT | Performed by: SURGERY

## 2021-11-30 ENCOUNTER — APPOINTMENT (OUTPATIENT)
Dept: ULTRASOUND IMAGING | Facility: HOSPITAL | Age: 76
End: 2021-11-30

## 2021-11-30 RX ORDER — MULTIPLE VITAMINS W/ MINERALS TAB 9MG-400MCG
2 TAB ORAL DAILY
COMMUNITY

## 2021-12-02 ENCOUNTER — HOSPITAL ENCOUNTER (OUTPATIENT)
Dept: ULTRASOUND IMAGING | Facility: HOSPITAL | Age: 76
Discharge: HOME | End: 2021-12-02

## 2021-12-02 ENCOUNTER — LAB (OUTPATIENT)
Dept: LAB | Facility: HOSPITAL | Age: 76
End: 2021-12-02

## 2021-12-02 DIAGNOSIS — N60.92 ATYPICAL DUCTAL HYPERPLASIA OF LEFT BREAST: ICD-10-CM

## 2021-12-02 DIAGNOSIS — R92.8 ABNORMAL MAMMOGRAM: ICD-10-CM

## 2021-12-02 PROCEDURE — C9803 HOPD COVID-19 SPEC COLLECT: HCPCS

## 2021-12-02 PROCEDURE — 87635 SARS-COV-2 COVID-19 AMP PRB: CPT

## 2021-12-02 PROCEDURE — 76642 ULTRASOUND BREAST LIMITED: CPT

## 2021-12-03 LAB — SARS-COV-2 N GENE RESP QL NAA+PROBE: NOT DETECTED

## 2021-12-06 ENCOUNTER — LAB (OUTPATIENT)
Dept: LAB | Facility: HOSPITAL | Age: 76
End: 2021-12-06

## 2021-12-06 DIAGNOSIS — E03.9 ACQUIRED HYPOTHYROIDISM: ICD-10-CM

## 2021-12-06 PROCEDURE — 36415 COLL VENOUS BLD VENIPUNCTURE: CPT

## 2021-12-06 PROCEDURE — 80053 COMPREHEN METABOLIC PANEL: CPT

## 2021-12-06 PROCEDURE — 84443 ASSAY THYROID STIM HORMONE: CPT

## 2021-12-07 ENCOUNTER — ANESTHESIA (OUTPATIENT)
Dept: PERIOP | Facility: HOSPITAL | Age: 76
End: 2021-12-07

## 2021-12-07 ENCOUNTER — APPOINTMENT (OUTPATIENT)
Dept: MAMMOGRAPHY | Facility: HOSPITAL | Age: 76
End: 2021-12-07

## 2021-12-07 ENCOUNTER — ANESTHESIA EVENT (OUTPATIENT)
Dept: PERIOP | Facility: HOSPITAL | Age: 76
End: 2021-12-07

## 2021-12-07 ENCOUNTER — HOSPITAL ENCOUNTER (OUTPATIENT)
Facility: HOSPITAL | Age: 76
Setting detail: HOSPITAL OUTPATIENT SURGERY
Discharge: HOME | End: 2021-12-07
Attending: SURGERY | Admitting: SURGERY

## 2021-12-07 ENCOUNTER — HOSPITAL ENCOUNTER (OUTPATIENT)
Dept: MAMMOGRAPHY | Facility: HOSPITAL | Age: 76
Discharge: HOME | End: 2021-12-07

## 2021-12-07 VITALS
HEIGHT: 61 IN | WEIGHT: 134.26 LBS | RESPIRATION RATE: 16 BRPM | DIASTOLIC BLOOD PRESSURE: 80 MMHG | HEART RATE: 49 BPM | OXYGEN SATURATION: 96 % | BODY MASS INDEX: 25.35 KG/M2 | TEMPERATURE: 97 F | SYSTOLIC BLOOD PRESSURE: 149 MMHG

## 2021-12-07 DIAGNOSIS — N60.92 ATYPICAL DUCTAL HYPERPLASIA OF LEFT BREAST: ICD-10-CM

## 2021-12-07 DIAGNOSIS — E87.6 HYPOKALEMIA: Primary | ICD-10-CM

## 2021-12-07 LAB
ALBUMIN SERPL-MCNC: 4.4 G/DL (ref 3.5–5.2)
ALBUMIN/GLOB SERPL: 1.6 G/DL
ALP SERPL-CCNC: 85 U/L (ref 39–117)
ALT SERPL W P-5'-P-CCNC: 15 U/L (ref 1–33)
ANION GAP SERPL CALCULATED.3IONS-SCNC: 14.8 MMOL/L (ref 5–15)
AST SERPL-CCNC: 18 U/L (ref 1–32)
BILIRUB SERPL-MCNC: <0.2 MG/DL (ref 0–1.2)
BUN SERPL-MCNC: 16 MG/DL (ref 8–23)
BUN/CREAT SERPL: 23.2 (ref 7–25)
CALCIUM SPEC-SCNC: 9.6 MG/DL (ref 8.6–10.5)
CHLORIDE SERPL-SCNC: 101 MMOL/L (ref 98–107)
CO2 SERPL-SCNC: 24.2 MMOL/L (ref 22–29)
CREAT SERPL-MCNC: 0.69 MG/DL (ref 0.57–1)
GFR SERPL CREATININE-BSD FRML MDRD: 82 ML/MIN/1.73
GLOBULIN UR ELPH-MCNC: 2.8 GM/DL
GLUCOSE SERPL-MCNC: 98 MG/DL (ref 65–99)
POTASSIUM SERPL-SCNC: 3.4 MMOL/L (ref 3.5–5.2)
PROT SERPL-MCNC: 7.2 G/DL (ref 6–8.5)
SODIUM SERPL-SCNC: 140 MMOL/L (ref 136–145)
TSH SERPL DL<=0.05 MIU/L-ACNC: 2.04 UIU/ML (ref 0.27–4.2)

## 2021-12-07 PROCEDURE — 25010000002 FENTANYL CITRATE (PF) 50 MCG/ML SOLUTION: Performed by: NURSE ANESTHETIST, CERTIFIED REGISTERED

## 2021-12-07 PROCEDURE — C1819 TISSUE LOCALIZATION-EXCISION: HCPCS

## 2021-12-07 PROCEDURE — 25010000002 NEOSTIGMINE 10 MG/10ML SOLUTION: Performed by: NURSE ANESTHETIST, CERTIFIED REGISTERED

## 2021-12-07 PROCEDURE — 25010000002 PROPOFOL 10 MG/ML EMULSION: Performed by: NURSE ANESTHETIST, CERTIFIED REGISTERED

## 2021-12-07 PROCEDURE — 25010000002 MIDAZOLAM PER 1MG: Performed by: ANESTHESIOLOGY

## 2021-12-07 PROCEDURE — 19125 EXCISION BREAST LESION: CPT | Performed by: SURGERY

## 2021-12-07 PROCEDURE — 0 LIDOCAINE 1 % SOLUTION: Performed by: SURGERY

## 2021-12-07 PROCEDURE — 76098 X-RAY EXAM SURGICAL SPECIMEN: CPT

## 2021-12-07 PROCEDURE — 25010000002 DEXAMETHASONE PER 1 MG: Performed by: NURSE ANESTHETIST, CERTIFIED REGISTERED

## 2021-12-07 PROCEDURE — 88307 TISSUE EXAM BY PATHOLOGIST: CPT | Performed by: SURGERY

## 2021-12-07 PROCEDURE — 25010000002 ONDANSETRON PER 1 MG: Performed by: NURSE ANESTHETIST, CERTIFIED REGISTERED

## 2021-12-07 DEVICE — LIGACLIP MCA MULTIPLE CLIP APPLIERS, 20 MEDIUM CLIPS
Type: IMPLANTABLE DEVICE | Site: BREAST | Status: FUNCTIONAL
Brand: LIGACLIP

## 2021-12-07 RX ORDER — PROMETHAZINE HYDROCHLORIDE 25 MG/1
25 SUPPOSITORY RECTAL ONCE AS NEEDED
Status: DISCONTINUED | OUTPATIENT
Start: 2021-12-07 | End: 2021-12-07 | Stop reason: HOSPADM

## 2021-12-07 RX ORDER — EPHEDRINE SULFATE 50 MG/ML
INJECTION, SOLUTION INTRAVENOUS AS NEEDED
Status: DISCONTINUED | OUTPATIENT
Start: 2021-12-07 | End: 2021-12-07 | Stop reason: SURG

## 2021-12-07 RX ORDER — CLINDAMYCIN PHOSPHATE 900 MG/50ML
900 INJECTION INTRAVENOUS ONCE
Status: COMPLETED | OUTPATIENT
Start: 2021-12-07 | End: 2021-12-07

## 2021-12-07 RX ORDER — ACETAMINOPHEN 500 MG
1000 TABLET ORAL ONCE
Status: COMPLETED | OUTPATIENT
Start: 2021-12-07 | End: 2021-12-07

## 2021-12-07 RX ORDER — NEOSTIGMINE METHYLSULFATE 1 MG/ML
INJECTION, SOLUTION INTRAVENOUS AS NEEDED
Status: DISCONTINUED | OUTPATIENT
Start: 2021-12-07 | End: 2021-12-07 | Stop reason: SURG

## 2021-12-07 RX ORDER — GLYCOPYRROLATE 0.2 MG/ML
INJECTION INTRAMUSCULAR; INTRAVENOUS AS NEEDED
Status: DISCONTINUED | OUTPATIENT
Start: 2021-12-07 | End: 2021-12-07 | Stop reason: SURG

## 2021-12-07 RX ORDER — DEXAMETHASONE SODIUM PHOSPHATE 4 MG/ML
INJECTION, SOLUTION INTRA-ARTICULAR; INTRALESIONAL; INTRAMUSCULAR; INTRAVENOUS; SOFT TISSUE AS NEEDED
Status: DISCONTINUED | OUTPATIENT
Start: 2021-12-07 | End: 2021-12-07 | Stop reason: SURG

## 2021-12-07 RX ORDER — MEPERIDINE HYDROCHLORIDE 25 MG/ML
12.5 INJECTION INTRAMUSCULAR; INTRAVENOUS; SUBCUTANEOUS
Status: DISCONTINUED | OUTPATIENT
Start: 2021-12-07 | End: 2021-12-07 | Stop reason: HOSPADM

## 2021-12-07 RX ORDER — OXYCODONE HYDROCHLORIDE 5 MG/1
5 TABLET ORAL
Status: DISCONTINUED | OUTPATIENT
Start: 2021-12-07 | End: 2021-12-07 | Stop reason: HOSPADM

## 2021-12-07 RX ORDER — PROMETHAZINE HYDROCHLORIDE 12.5 MG/1
25 TABLET ORAL ONCE AS NEEDED
Status: DISCONTINUED | OUTPATIENT
Start: 2021-12-07 | End: 2021-12-07 | Stop reason: HOSPADM

## 2021-12-07 RX ORDER — ONDANSETRON 2 MG/ML
INJECTION INTRAMUSCULAR; INTRAVENOUS AS NEEDED
Status: DISCONTINUED | OUTPATIENT
Start: 2021-12-07 | End: 2021-12-07 | Stop reason: SURG

## 2021-12-07 RX ORDER — MIDAZOLAM HYDROCHLORIDE 2 MG/2ML
2 INJECTION, SOLUTION INTRAMUSCULAR; INTRAVENOUS ONCE
Status: COMPLETED | OUTPATIENT
Start: 2021-12-07 | End: 2021-12-07

## 2021-12-07 RX ORDER — SODIUM CHLORIDE, SODIUM LACTATE, POTASSIUM CHLORIDE, CALCIUM CHLORIDE 600; 310; 30; 20 MG/100ML; MG/100ML; MG/100ML; MG/100ML
9 INJECTION, SOLUTION INTRAVENOUS CONTINUOUS PRN
Status: DISCONTINUED | OUTPATIENT
Start: 2021-12-07 | End: 2021-12-07 | Stop reason: HOSPADM

## 2021-12-07 RX ORDER — ONDANSETRON 4 MG/1
4 TABLET, FILM COATED ORAL ONCE AS NEEDED
Status: DISCONTINUED | OUTPATIENT
Start: 2021-12-07 | End: 2021-12-07 | Stop reason: HOSPADM

## 2021-12-07 RX ORDER — LIDOCAINE HYDROCHLORIDE 10 MG/ML
20 INJECTION, SOLUTION INFILTRATION; PERINEURAL ONCE
Status: COMPLETED | OUTPATIENT
Start: 2021-12-07 | End: 2021-12-07

## 2021-12-07 RX ORDER — ROCURONIUM BROMIDE 10 MG/ML
INJECTION, SOLUTION INTRAVENOUS AS NEEDED
Status: DISCONTINUED | OUTPATIENT
Start: 2021-12-07 | End: 2021-12-07 | Stop reason: SURG

## 2021-12-07 RX ORDER — BUPIVACAINE HYDROCHLORIDE 2.5 MG/ML
INJECTION, SOLUTION EPIDURAL; INFILTRATION; INTRACAUDAL AS NEEDED
Status: DISCONTINUED | OUTPATIENT
Start: 2021-12-07 | End: 2021-12-07 | Stop reason: HOSPADM

## 2021-12-07 RX ORDER — HYDROCODONE BITARTRATE AND ACETAMINOPHEN 5; 325 MG/1; MG/1
1-2 TABLET ORAL EVERY 4 HOURS PRN
Qty: 25 TABLET | Refills: 0 | Status: SHIPPED | OUTPATIENT
Start: 2021-12-07 | End: 2022-03-22

## 2021-12-07 RX ORDER — LIDOCAINE HYDROCHLORIDE 10 MG/ML
10 INJECTION, SOLUTION INFILTRATION; PERINEURAL ONCE
Status: COMPLETED | OUTPATIENT
Start: 2021-12-07 | End: 2021-12-07

## 2021-12-07 RX ORDER — ONDANSETRON 2 MG/ML
4 INJECTION INTRAMUSCULAR; INTRAVENOUS ONCE AS NEEDED
Status: DISCONTINUED | OUTPATIENT
Start: 2021-12-07 | End: 2021-12-07 | Stop reason: HOSPADM

## 2021-12-07 RX ORDER — FENTANYL CITRATE 50 UG/ML
INJECTION, SOLUTION INTRAMUSCULAR; INTRAVENOUS AS NEEDED
Status: DISCONTINUED | OUTPATIENT
Start: 2021-12-07 | End: 2021-12-07 | Stop reason: SURG

## 2021-12-07 RX ORDER — LIDOCAINE HYDROCHLORIDE 20 MG/ML
INJECTION, SOLUTION INFILTRATION; PERINEURAL AS NEEDED
Status: DISCONTINUED | OUTPATIENT
Start: 2021-12-07 | End: 2021-12-07 | Stop reason: SURG

## 2021-12-07 RX ORDER — PROPOFOL 10 MG/ML
VIAL (ML) INTRAVENOUS AS NEEDED
Status: DISCONTINUED | OUTPATIENT
Start: 2021-12-07 | End: 2021-12-07 | Stop reason: SURG

## 2021-12-07 RX ADMIN — MIDAZOLAM HYDROCHLORIDE 2 MG: 1 INJECTION, SOLUTION INTRAMUSCULAR; INTRAVENOUS at 11:26

## 2021-12-07 RX ADMIN — NEOSTIGMINE METHYLSULFATE 3 MG: 1 INJECTION INTRAVENOUS at 12:27

## 2021-12-07 RX ADMIN — CLINDAMYCIN PHOSPHATE 900 MG: 900 INJECTION, SOLUTION INTRAVENOUS at 11:53

## 2021-12-07 RX ADMIN — ACETAMINOPHEN 1000 MG: 500 TABLET ORAL at 08:47

## 2021-12-07 RX ADMIN — FENTANYL CITRATE 75 MCG: 50 INJECTION, SOLUTION INTRAMUSCULAR; INTRAVENOUS at 11:53

## 2021-12-07 RX ADMIN — LIDOCAINE HYDROCHLORIDE 100 MG: 20 INJECTION, SOLUTION INFILTRATION; PERINEURAL at 11:53

## 2021-12-07 RX ADMIN — LIDOCAINE HYDROCHLORIDE 20 ML: 10 INJECTION, SOLUTION INFILTRATION; PERINEURAL at 10:07

## 2021-12-07 RX ADMIN — EPHEDRINE SULFATE 10 MG: 50 INJECTION INTRAVENOUS at 11:59

## 2021-12-07 RX ADMIN — GLYCOPYRROLATE 0.4 MCG: 0.2 INJECTION INTRAMUSCULAR; INTRAVENOUS at 12:27

## 2021-12-07 RX ADMIN — DEXAMETHASONE SODIUM PHOSPHATE 4 MG: 4 INJECTION INTRA-ARTICULAR; INTRALESIONAL; INTRAMUSCULAR; INTRAVENOUS; SOFT TISSUE at 12:07

## 2021-12-07 RX ADMIN — ONDANSETRON 4 MG: 2 INJECTION INTRAMUSCULAR; INTRAVENOUS at 12:15

## 2021-12-07 RX ADMIN — PROPOFOL 80 MG: 10 INJECTION, EMULSION INTRAVENOUS at 11:53

## 2021-12-07 RX ADMIN — LIDOCAINE HYDROCHLORIDE 10 ML: 10 INJECTION, SOLUTION INFILTRATION; PERINEURAL at 11:01

## 2021-12-07 RX ADMIN — ROCURONIUM BROMIDE 30 MG: 10 INJECTION INTRAVENOUS at 11:53

## 2021-12-07 RX ADMIN — SODIUM CHLORIDE, POTASSIUM CHLORIDE, SODIUM LACTATE AND CALCIUM CHLORIDE 9 ML/HR: 600; 310; 30; 20 INJECTION, SOLUTION INTRAVENOUS at 08:47

## 2021-12-07 NOTE — ANESTHESIA PREPROCEDURE EVALUATION
Anesthesia Evaluation     Patient summary reviewed and Nursing notes reviewed   no history of anesthetic complications:  NPO Solid Status: > 8 hours  NPO Liquid Status: > 2 hours           Airway   Mallampati: I  TM distance: >3 FB  Neck ROM: full  No difficulty expected  Dental    (+) upper dentures    Pulmonary - negative pulmonary ROS and normal exam    breath sounds clear to auscultation  Cardiovascular - normal exam  Exercise tolerance: good (4-7 METS)    Rhythm: regular    (+) hypertension, dysrhythmias,       Neuro/Psych  (+) numbness, psychiatric history Anxiety and Depression,     GI/Hepatic/Renal/Endo - negative ROS     Musculoskeletal (-) negative ROS    Abdominal    Substance History - negative use     OB/GYN negative ob/gyn ROS         Other - negative ROS                       Anesthesia Plan    ASA 3     general       Anesthetic plan, all risks, benefits, and alternatives have been provided, discussed and informed consent has been obtained with: patient and child.

## 2021-12-07 NOTE — DISCHARGE INSTRUCTIONS
Please follow purple sheet instructions       DISCHARGE INSTRUCTIONS  [] Breast Biopsy  [] Lumpectomy  [] Lymph Node Dissection           ? For your surgery you had:  ? General anesthesia (you may have a sore throat for the first 24 hours)  ? IV sedation  ? Local anesthesia  ? Monitored anesthesia care  ? You have received a medicated patch for nausea prevention today (behind your ear). It is recommended that you remove it 24-48 hours post-operatively. It must be removed within 72 hours.   ? You have received an anesthesia medication today that can cause hormonal forms of birth control to be ineffective. You should use a different form of birth control (to prevent pregnancy) for 7 days.   ? You may experience dizziness, drowsiness, or light-headedness for several hours following surgery/procedure.  ? Do not stay alone today or tonight.  ? Limit your activity for 24 hours.  ? You should not drive, operate machinery, drink alcohol, or sign legally binding documents for 24 hours or while you are taking pain medication.  ? Resume your diet slowly.  Follow whatever special dietary instructions you may have been given by your doctor.  Last dose of pain medication was given at:   .  NOTIFY YOUR DOCTOR IF YOU EXPERIENCE ANY OF THE FOLLOWING:  ? Temperature greater than 101 degrees Fahrenheit  ? Shaking Chills  ? Redness or excessive drainage from incision  ? Nausea, vomiting and/or pain that is not controlled by prescribed medications  ? Increase in bleeding or bleeding that is excessive  ? Unable to urinate in 6 hours after surgery  ? If unable to reach your doctor, please go to the closest Emergency Room  ? You may begin dressing changes:     [] in 24 hours   [] in 48 hours   [] Other:    ? You may remove your dressing on  .  You may shower or bathe:  []tomorrow  [] other    ? Ice pack for 24-48 hours.  ? Wear a bra for support.  ? Do not do any heavy lifting.  ? After your surgery you may notice some bruising.  This is  normal.  ? Medications per physician instructions as indicated on Discharge Medication Information Sheet.  You should see   for follow-up care   on   .  Phone number:     ?   SPECIAL INSTRUCTIONS:

## 2021-12-07 NOTE — INTERVAL H&P NOTE
H&P updated. The patient was examined and the following changes are noted:  Risks and benefits discussed with patient and allergies reveiwed.

## 2021-12-07 NOTE — ANESTHESIA POSTPROCEDURE EVALUATION
Patient: Lena White    Procedure Summary     Date: 12/07/21 Room / Location: [facility] OR [facility] / [facility]    Anesthesia Start: 1148 Anesthesia Stop: 1237    Procedure: Left breast needle localized lumpectomy (Left Breast) Diagnosis:       Atypical ductal hyperplasia of left breast      (Atypical ductal hyperplasia of left breast [N60.92])    Surgeons: Cady Dunne MD Provider: Garcia Grace MD    Anesthesia Type: general ASA Status: 3          Anesthesia Type: general    Vitals  Vitals Value Taken Time   /79 12/07/21 1252   Temp     Pulse 65 12/07/21 1255   Resp     SpO2 94 % 12/07/21 1255   Vitals shown include unvalidated device data.        Post Anesthesia Care and Evaluation    Patient location during evaluation: bedside  Patient participation: complete - patient participated  Level of consciousness: awake  Pain management: adequate  Airway patency: patent  Anesthetic complications: No anesthetic complications  PONV Status: none  Cardiovascular status: acceptable and stable  Respiratory status: acceptable  Hydration status: acceptable    Comments: An Anesthesiologist personally participated in the most demanding procedures (including induction and emergence if applicable) in the anesthesia plan, monitored the course of anesthesia administration at frequent intervals and remained physically present and available for immediate diagnosis and treatment of emergencies.

## 2021-12-07 NOTE — OP NOTE
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    A Breast, Left Tissue · TISSUE PATHOLOGY EXAM   Cady Dunne MD 12/7/21 1208 No    Description: LEFT BREAST MASS WITH NEEDLE LOCALIZATION, SHORT STITCH SUPERIOR LONG STITCH LATERAL    Comment: SENT FRESH TO MAMMOGRAPHY THEN TO PATHOLOGY FOR PERMANENT  [facility] #[id number]    This specimen was not marked as sent.              Findings: none    Complications: none    Description of Procedure:   The risks and benefits and treatment options were discussed with her. After informed consent was obtained, she was taken to the OR and placed supine on the table. She was taken to radiology where a wire was placed localizing the clips. An incision was made around the wire and then skin flaps were raised anteriorly and the tissue was excised circumferentially around the area. The specimen was removed, marked for orientation, and handed off as a specimen. The wound bed was irrigated. Hemostasis was achieved with electrocautery and clips. The deeper layers were closed with 2-0 Vicryl, the skin was closed with a 4.0 subcuticular stitch. The specimen had been sent to radiology for evaluation and they did call back and confirm that we had the the wire, the clip, the abnormal appearing tissue and a good margin of normal tissue around  it.  Assistant: Bonnie Sheppard  was responsible for performing the following activities: Retraction and Closing and their skilled assistance was necessary for the success of this case.    Cady Dunne MD     Date: 12/7/2021  Time: 12:20 EST

## 2021-12-08 LAB
CYTO UR: NORMAL
LAB AP CASE REPORT: NORMAL
LAB AP CLINICAL INFORMATION: NORMAL
PATH REPORT.FINAL DX SPEC: NORMAL
PATH REPORT.GROSS SPEC: NORMAL

## 2021-12-10 RX ORDER — LISINOPRIL AND HYDROCHLOROTHIAZIDE 12.5; 1 MG/1; MG/1
TABLET ORAL
Qty: 90 TABLET | Refills: 1 | Status: SHIPPED | OUTPATIENT
Start: 2021-12-10 | End: 2022-05-19 | Stop reason: SDUPTHER

## 2021-12-11 NOTE — PROGRESS NOTES
"Chief Complaint  Follow-up    Subjective          History of Present Illness  The patient is here to follow up on needle localized lumpectomy.  They are doing well and have no complaints.  Pathology is shown below:    Clinical Information    Comment:    Atypical ductal hyperplasia of left breast      Final Diagnosis   Left breast mass, excision:               - Fibrosis               - Biopsy site changes         Objective   Vital Signs:   Ht 153.7 cm (60.5\")   Wt 62.6 kg (138 lb)   BMI 26.51 kg/m²     Physical Exam  Vitals and nursing note reviewed.   Constitutional:       General: She is not in acute distress.     Appearance: Normal appearance. She is well-developed.   HENT:      Head: Normocephalic and atraumatic.   Eyes:      Extraocular Movements: Extraocular movements intact.      Pupils: Pupils are equal, round, and reactive to light.   Cardiovascular:      Pulses: Normal pulses.   Pulmonary:      Effort: Pulmonary effort is normal. No retractions.      Breath sounds: Normal air entry. No wheezing.   Abdominal:      General: There is no distension.      Palpations: Abdomen is soft.      Tenderness: There is no abdominal tenderness.      Hernia: No hernia is present.   Musculoskeletal:         General: No swelling or deformity.      Cervical back: Neck supple.   Skin:     General: Skin is warm and dry.      Findings: No erythema.      Comments: Surgical Incision Healing Well   Neurological:      General: No focal deficit present.      Mental Status: She is alert and oriented to person, place, and time.      Motor: Motor function is intact.   Psychiatric:         Mood and Affect: Mood normal.         Thought Content: Thought content normal.            Result Review :                 Assessment and Plan    Diagnoses and all orders for this visit:    1. Atypical ductal hyperplasia of left breast (Primary)  -     Ambulatory Referral to Oncology    Will refer to oncology for chemical risk reduction  Return to " routine screening and will need screening mammogram in June 2022    Follow Up   Return in about 7 months (around 7/13/2022), or if symptoms worsen or fail to improve.  Patient was given instructions and counseling regarding her condition or for health maintenance advice. Please see specific information pulled into the AVS if appropriate.

## 2021-12-13 ENCOUNTER — OFFICE VISIT (OUTPATIENT)
Dept: SURGERY | Facility: CLINIC | Age: 76
End: 2021-12-13

## 2021-12-13 VITALS — BODY MASS INDEX: 26.06 KG/M2 | WEIGHT: 138 LBS | HEIGHT: 61 IN

## 2021-12-13 DIAGNOSIS — N60.92 ATYPICAL DUCTAL HYPERPLASIA OF LEFT BREAST: Primary | ICD-10-CM

## 2021-12-13 PROCEDURE — 99024 POSTOP FOLLOW-UP VISIT: CPT | Performed by: SURGERY

## 2021-12-23 DIAGNOSIS — R92.8 ABNORMAL MAMMOGRAM: Primary | ICD-10-CM

## 2021-12-24 ENCOUNTER — DOCUMENTATION (OUTPATIENT)
Dept: SURGERY | Facility: CLINIC | Age: 76
End: 2021-12-24

## 2021-12-24 RX ORDER — SULFAMETHOXAZOLE AND TRIMETHOPRIM 800; 160 MG/1; MG/1
1 TABLET ORAL 2 TIMES DAILY
Qty: 14 TABLET | Refills: 0 | Status: SHIPPED | OUTPATIENT
Start: 2021-12-24 | End: 2022-01-03 | Stop reason: SDUPTHER

## 2021-12-27 ENCOUNTER — OFFICE VISIT (OUTPATIENT)
Dept: SURGERY | Facility: CLINIC | Age: 76
End: 2021-12-27

## 2021-12-27 ENCOUNTER — TELEPHONE (OUTPATIENT)
Dept: SURGERY | Facility: CLINIC | Age: 76
End: 2021-12-27

## 2021-12-27 VITALS — HEART RATE: 76 BPM | BODY MASS INDEX: 25.68 KG/M2 | WEIGHT: 136 LBS | HEIGHT: 61 IN | OXYGEN SATURATION: 97 %

## 2021-12-27 DIAGNOSIS — N63.13 MASS OF LOWER OUTER QUADRANT OF RIGHT BREAST: Primary | ICD-10-CM

## 2021-12-27 PROCEDURE — 99024 POSTOP FOLLOW-UP VISIT: CPT | Performed by: SURGERY

## 2021-12-27 NOTE — TELEPHONE ENCOUNTER
Pt was told that if ABX did not help to call today to be worked in.  The medication did not help.  Wants to be seen.

## 2021-12-27 NOTE — PROGRESS NOTES
"Chief Complaint  Follow-up (Breast Infection)    Subjective          History of Present Illness  The patient is here to follow up on needle localized lumpectomy.  They was doing well and had no complaints until she laid on her stomach overnight and woke up and breast was red and possibly bruised.  Pathology is shown below:    Clinical Information    Comment:    Atypical ductal hyperplasia of left breast      Final Diagnosis   Left breast mass, excision:               - Fibrosis               - Biopsy site changes       Objective   Vital Signs:   Pulse 76   Ht 153.7 cm (60.5\")   Wt 61.7 kg (136 lb)   SpO2 97%   BMI 26.12 kg/m²     Physical Exam  Vitals and nursing note reviewed.   Constitutional:       General: She is not in acute distress.     Appearance: Normal appearance. She is well-developed.   HENT:      Head: Normocephalic and atraumatic.   Eyes:      Extraocular Movements: Extraocular movements intact.      Pupils: Pupils are equal, round, and reactive to light.   Cardiovascular:      Pulses: Normal pulses.   Pulmonary:      Effort: Pulmonary effort is normal. No retractions.      Breath sounds: Normal air entry. No wheezing.   Abdominal:      General: There is no distension.      Palpations: Abdomen is soft.      Tenderness: There is no abdominal tenderness.      Hernia: No hernia is present.   Musculoskeletal:         General: No swelling or deformity.      Cervical back: Neck supple.   Skin:     General: Skin is warm and dry.      Findings: No erythema.      Comments: Surgical Incision Healing Well, some redness    Neurological:      General: No focal deficit present.      Mental Status: She is alert and oriented to person, place, and time.      Motor: Motor function is intact.   Psychiatric:         Mood and Affect: Mood normal.         Thought Content: Thought content normal.        Assessment and Plan    Diagnoses and all orders for this visit:    1. Mass of lower outer quadrant of right breast " (Primary)    Will refer to oncology for chemical risk reduction  Return to routine screening and will need screening mammogram in June 2022  Followup in one week for recheck    Follow Up   Return in about 1 week (around 1/3/2022).  Patient was given instructions and counseling regarding her condition or for health maintenance advice. Please see specific information pulled into the AVS if appropriate.

## 2021-12-30 ENCOUNTER — LAB (OUTPATIENT)
Dept: LAB | Facility: HOSPITAL | Age: 76
End: 2021-12-30

## 2021-12-30 DIAGNOSIS — E87.6 HYPOKALEMIA: ICD-10-CM

## 2021-12-30 LAB
ANION GAP SERPL CALCULATED.3IONS-SCNC: 12.4 MMOL/L (ref 5–15)
BUN SERPL-MCNC: 12 MG/DL (ref 8–23)
BUN/CREAT SERPL: 13.5 (ref 7–25)
CALCIUM SPEC-SCNC: 9.8 MG/DL (ref 8.6–10.5)
CHLORIDE SERPL-SCNC: 102 MMOL/L (ref 98–107)
CO2 SERPL-SCNC: 24.6 MMOL/L (ref 22–29)
CREAT SERPL-MCNC: 0.89 MG/DL (ref 0.57–1)
GFR SERPL CREATININE-BSD FRML MDRD: 61 ML/MIN/1.73
GLUCOSE SERPL-MCNC: 116 MG/DL (ref 65–99)
POTASSIUM SERPL-SCNC: 4 MMOL/L (ref 3.5–5.2)
SODIUM SERPL-SCNC: 139 MMOL/L (ref 136–145)

## 2021-12-30 PROCEDURE — 36415 COLL VENOUS BLD VENIPUNCTURE: CPT

## 2021-12-30 PROCEDURE — 80048 BASIC METABOLIC PNL TOTAL CA: CPT

## 2021-12-30 RX ORDER — LEVOTHYROXINE SODIUM 88 UG/1
TABLET ORAL
Qty: 90 TABLET | Refills: 0 | Status: SHIPPED | OUTPATIENT
Start: 2021-12-30 | End: 2022-03-29

## 2022-01-03 ENCOUNTER — OFFICE VISIT (OUTPATIENT)
Dept: SURGERY | Facility: CLINIC | Age: 77
End: 2022-01-03

## 2022-01-03 VITALS — RESPIRATION RATE: 16 BRPM | HEIGHT: 61 IN | WEIGHT: 136.02 LBS | BODY MASS INDEX: 25.68 KG/M2

## 2022-01-03 DIAGNOSIS — N63.13 MASS OF LOWER OUTER QUADRANT OF RIGHT BREAST: Primary | ICD-10-CM

## 2022-01-03 PROCEDURE — 99024 POSTOP FOLLOW-UP VISIT: CPT | Performed by: SURGERY

## 2022-01-03 RX ORDER — SULFAMETHOXAZOLE AND TRIMETHOPRIM 800; 160 MG/1; MG/1
1 TABLET ORAL 2 TIMES DAILY
Qty: 14 TABLET | Refills: 0 | Status: SHIPPED | OUTPATIENT
Start: 2022-01-03 | End: 2022-01-10

## 2022-01-03 RX ORDER — TRAZODONE HYDROCHLORIDE 100 MG/1
TABLET ORAL
COMMUNITY
Start: 2021-12-10 | End: 2022-03-04

## 2022-01-10 ENCOUNTER — CONSULT (OUTPATIENT)
Dept: ONCOLOGY | Facility: HOSPITAL | Age: 77
End: 2022-01-10

## 2022-01-10 VITALS
BODY MASS INDEX: 25.35 KG/M2 | SYSTOLIC BLOOD PRESSURE: 124 MMHG | HEIGHT: 61 IN | OXYGEN SATURATION: 97 % | WEIGHT: 134.26 LBS | RESPIRATION RATE: 18 BRPM | DIASTOLIC BLOOD PRESSURE: 77 MMHG | TEMPERATURE: 98.3 F | HEART RATE: 89 BPM

## 2022-01-10 DIAGNOSIS — N60.92 ATYPICAL DUCTAL HYPERPLASIA OF LEFT BREAST: ICD-10-CM

## 2022-01-10 PROCEDURE — G0463 HOSPITAL OUTPT CLINIC VISIT: HCPCS | Performed by: INTERNAL MEDICINE

## 2022-01-10 PROCEDURE — 99204 OFFICE O/P NEW MOD 45 MIN: CPT | Performed by: INTERNAL MEDICINE

## 2022-01-10 NOTE — PROGRESS NOTES
Patient   Lena [facility]    Chief Complaint  Atypical ductal Hyperplasia of Left Breast (-New PT)    Referring Provider: Cady Dunne MD  PCP: Chery Wright MD    Subjective          Oncology/Hematology History    No history exists.       History of Present Illness  Patient comes in today for initial evaluation after diagnosis of ADH of the left breast.  She was initially found to have an abnormality in breast screening mammogram.  She underwent core needle biopsy of the left breast on 8/11/2021.  She was found to have focal atypical ductal hyperplasia with a hyalinized fibroadenoma.  There were also coarse microcalcifications identified but no DCIS or invasive cancer.  The patient then underwent lumpectomy on 12/7/2021.  This found only fibrosis and biopsy related changes.  The patient was referred here for consideration of adjuvant endocrine therapy for breast cancer risk reduction.  She has no additional concerns and has healed well from surgery.      Review of Systems   Constitutional: Negative for appetite change, diaphoresis, fever, unexpected weight gain and unexpected weight loss.   HENT: Negative for hearing loss, sore throat and voice change.    Eyes: Negative for blurred vision, double vision, pain, redness and visual disturbance.   Respiratory: Negative for cough, shortness of breath and wheezing.    Cardiovascular: Negative for chest pain, palpitations and leg swelling.   Endocrine: Negative for cold intolerance, heat intolerance, polydipsia and polyuria.   Genitourinary: Negative for decreased urine volume, difficulty urinating, frequency and urinary incontinence.   Musculoskeletal: Negative for arthralgias, back pain, joint swelling and myalgias.   Skin: Negative for color change, rash, skin lesions and bruise.   Neurological: Negative for dizziness, seizures, numbness and headache.   Hematological: Negative for  adenopathy. Does not bruise/bleed easily.   Psychiatric/Behavioral: Negative for depressed mood. The patient is not nervous/anxious.    All other systems reviewed and are negative.      Past Medical History:   Diagnosis Date   • Atypical ductal hyperplasia of left breast    • Basal cell carcinoma     skin-face   • Dry eyes    • Hearing loss     wears aids   • History of transfusion    • Hyperlipidemia    • Hypertension    • Hypothyroidism    • Macular degeneration    • Osteoporosis    • Squamous cell skin cancer     face     Past Surgical History:   Procedure Laterality Date   • BREAST BIOPSY Left 12/7/2021    Procedure: Left breast needle localized lumpectomy;  Surgeon: Cady Dunne MD;  Location: [facility];  Service: General;  Laterality: Left;   • CHOLECYSTECTOMY     • EXPLORATORY LAPAROTOMY     • HYSTERECTOMY     • INCISIONAL HERNIA REPAIR      x2   • REPLACEMENT TOTAL KNEE Bilateral 07/2018   • SKIN CANCER EXCISION     • SPLENECTOMY     • TONSILLECTOMY     • VENTRAL HERNIA REPAIR  11/2016     Social History     Socioeconomic History   • Marital status:    Tobacco Use   • Smoking status: Never Smoker   • Smokeless tobacco: Never Used   Vaping Use   • Vaping Use: Never used   Substance and Sexual Activity   • Alcohol use: Never   • Drug use: Never   • Sexual activity: Defer     Family History   Problem Relation Age of Onset   • Sarcoidosis Mother    • Lung cancer Father        Objective   Physical Exam  General: Alert, cooperative, no acute distress, well appearing, healthy weight  Eyes: Anicteric sclera, PERRLA  Respiratory: normal respiratory effort  Cardiovascular: no lower extremity edema  Skin: Normal tone, no rash, no lesions  Psychiatric: Appropriate affect, intact judgment  Neurologic: No focal sensory or motor deficits, normal cognition   Musculoskeletal: Normal muscle strength and tone, easily ambulating on her own  Extremities: No clubbing, cyanosis, or deformities    Vitals:     "01/10/22 1434   BP: 124/77   Pulse: 89   Resp: 18   Temp: 98.3 °F (36.8 °C)   SpO2: 97%   Weight: 60.9 kg (134 lb 4.2 oz)   Height: 153.7 cm (60.51\")   PainSc: 0-No pain     ECOG score: 0         PHQ-9 Total Score: 0         Result Review :   The following data was reviewed by: Marisa Haywood MD PhD on 01/10/2022:  No results found for: HGB, HCT, MCV, PLT, WBC, NEUTROABS, LYMPHSABS, MONOSABS, EOSABS, BASOSABS  Lab Results   Component Value Date    GLUCOSE 116 (H) 12/30/2021    BUN 12 12/30/2021    CREATININE 0.89 12/30/2021     12/30/2021    K 4.0 12/30/2021     12/30/2021    CO2 24.6 12/30/2021    CALCIUM 9.8 12/30/2021    PROTEINTOT 7.2 12/06/2021    ALBUMIN 4.40 12/06/2021    BILITOT <0.2 12/06/2021    ALKPHOS 85 12/06/2021    AST 18 12/06/2021    ALT 15 12/06/2021          Assessment and Plan    Diagnoses and all orders for this visit:    1. Atypical ductal hyperplasia of left breast        ADH: I use the Shonda model risk calculation to estimate the patient's 5-year and lifetime breast cancer risk.  She is  and has a history of 3 or 4 previous breast biopsies.  This is her first diagnosis of ADH.  She has no prior diagnoses of DCIS, LCIS, or breast cancer.  She has no immediate family members with breast cancer.  Her first child was born when she was 17.  She started her period at the age of 7.  These facts put her 5-year breast cancer risk at 4% compared with 2.2% for an average 78-year-old woman.  Her lifetime risk of breast cancer is 7% compared to 3.8%.  For this reason she would not likely benefit from endocrine therapy for breast cancer risk reduction.  Furthermore, she already has a diagnosis of osteoporosis in her hips and this medication would further increase her risk of an osteoporotic fracture.  The patient will not need scheduled follow-up appointment in this clinic but is encouraged to contact us if there are any further questions or concerns.  She should continue annual " screening mammograms as recommended by her primary care provider.    Patient was given instructions and counseling regarding her condition or for health maintenance advice. Please see specific information pulled into the AVS if appropriate.     Marisa Haywood MD PhD    1/10/2022

## 2022-01-13 ENCOUNTER — PATIENT ROUNDING (BHMG ONLY) (OUTPATIENT)
Dept: ONCOLOGY | Facility: HOSPITAL | Age: 77
End: 2022-01-13

## 2022-01-13 RX ORDER — VENLAFAXINE HYDROCHLORIDE 150 MG/1
CAPSULE, EXTENDED RELEASE ORAL
Qty: 90 CAPSULE | Refills: 0 | Status: SHIPPED | OUTPATIENT
Start: 2022-01-13 | End: 2022-04-13

## 2022-01-13 NOTE — PROGRESS NOTES
January 13, 2022    Hello, may I speak with Lena White?    My name is Bella May.    I am  with [facility]  [address] [place] [address] AVE  [place] [address] [phone].    Before we get started may I verify your date of birth? 1945    I am calling to officially welcome you to our practice and ask about your recent visit. Is this a good time to talk? YES    Tell me about your visit with us. What things went well?     I spoke with the patient and she stated that the visit went well. She was satisfied with the visit and had no questions or concerns at this time. I gave her my direct number in case she had any questions or concerns in the future.     We're always looking for ways to make our patients' experiences even better. Do you have recommendations on ways we may improve?  NO    Overall were you satisfied with your first visit to our practice? YES       I appreciate you taking the time to speak with me today. Is there anything else I can do for you? NO      Thank you, and have a great day.

## 2022-02-08 RX ORDER — VENLAFAXINE HYDROCHLORIDE 75 MG/1
CAPSULE, EXTENDED RELEASE ORAL
Qty: 90 CAPSULE | Refills: 1 | Status: SHIPPED | OUTPATIENT
Start: 2022-02-08 | End: 2022-05-19 | Stop reason: SDUPTHER

## 2022-03-04 RX ORDER — TRAZODONE HYDROCHLORIDE 100 MG/1
TABLET ORAL
Qty: 45 TABLET | Refills: 1 | Status: SHIPPED | OUTPATIENT
Start: 2022-03-04 | End: 2022-05-19 | Stop reason: SDUPTHER

## 2022-03-22 ENCOUNTER — OFFICE VISIT (OUTPATIENT)
Dept: FAMILY MEDICINE CLINIC | Age: 77
End: 2022-03-22

## 2022-03-22 ENCOUNTER — HOSPITAL ENCOUNTER (OUTPATIENT)
Dept: GENERAL RADIOLOGY | Facility: HOSPITAL | Age: 77
Discharge: HOME | End: 2022-03-22
Admitting: FAMILY MEDICINE

## 2022-03-22 VITALS
TEMPERATURE: 98 F | WEIGHT: 131 LBS | DIASTOLIC BLOOD PRESSURE: 77 MMHG | SYSTOLIC BLOOD PRESSURE: 118 MMHG | HEART RATE: 66 BPM | HEIGHT: 61 IN | BODY MASS INDEX: 24.73 KG/M2

## 2022-03-22 DIAGNOSIS — M25.432 SWELLING OF LEFT WRIST: ICD-10-CM
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
## 2022-03-22 DIAGNOSIS — M79.643 TENDERNESS OF ANATOMICAL SNUFFBOX: ICD-10-CM

## 2022-03-22 DIAGNOSIS — M25.532 LEFT WRIST PAIN: Primary | ICD-10-CM

## 2022-03-22 DIAGNOSIS — M25.532 LEFT WRIST PAIN: ICD-10-CM

## 2022-03-22 PROBLEM — Z00.00 ANNUAL PHYSICAL EXAM: Status: RESOLVED | Noted: 2021-11-18 | Resolved: 2022-03-22

## 2022-03-22 PROCEDURE — 73100 X-RAY EXAM OF WRIST: CPT

## 2022-03-22 PROCEDURE — 99214 OFFICE O/P EST MOD 30 MIN: CPT | Performed by: FAMILY MEDICINE

## 2022-03-22 RX ORDER — HYDROCODONE BITARTRATE AND ACETAMINOPHEN 5; 325 MG/1; MG/1
1 TABLET ORAL EVERY 6 HOURS PRN
Qty: 20 TABLET | Refills: 0 | Status: SHIPPED | OUTPATIENT
Start: 2022-03-22 | End: 2022-12-01

## 2022-03-22 NOTE — PROGRESS NOTES
Chief Complaint  Hand Injury (Fell, left hand pain, swelling, can't move 03/20/22)    Subjective          Lena White presents to [facility] today as a work-in for acute L hand pain and swelling after she fell on Sunday, 3/20/22 (2 days ago).  She was hit from behind by a large dog and fell forward onto a gravel driveway, catching herself with her left hand.  She felt immediate pain but no popping or snapping.  She noticed significant pain and swelling last night that increased as the night went on.  Pain radiates up into the L upper arm.  She is unable to move the wrist in any direction.  Pain is constant and not comfortable in any position.      She does have osteoporosis for which she is on Fosamax.    Current Outpatient Medications:   •  alendronate (FOSAMAX) 70 MG tablet, TAKE 1 TABLET BY MOUTH EVERY WEEK (Patient taking differently: Take 70 mg by mouth Every 7 (Seven) Days. Takes on sundays), Disp: 12 tablet, Rfl: 3  •  levothyroxine (SYNTHROID, LEVOTHROID) 88 MCG tablet, TAKE 1 TABLET BY MOUTH EVERY DAY, Disp: 90 tablet, Rfl: 0  •  lisinopril-hydrochlorothiazide (PRINZIDE,ZESTORETIC) 10-12.5 MG per tablet, TAKE 1 TABLET BY MOUTH EVERY DAY, Disp: 90 tablet, Rfl: 1  •  multivitamin with minerals tablet tablet, Take 2 tablets by mouth Daily. Eye vitamin, Disp: , Rfl:   •  Nutritional Supplements (VITAMIN D BOOSTER PO), Take  by mouth Daily., Disp: , Rfl:   •  Omega-3 Fatty Acids (FISH OIL CONCENTRATE PO), Take  by mouth Daily., Disp: , Rfl:   •  traZODone (DESYREL) 100 MG tablet, TAKE 1/2 TO 1 TABLET BY MOUTH EVERY DAY AT BEDTIME AS NEEDED, Disp: 45 tablet, Rfl: 1  •  venlafaxine XR (EFFEXOR-XR) 150 MG 24 hr capsule, TAKE 1 CAPSULE BY MOUTH EVERY DAY, Disp: 90 capsule, Rfl: 0  •  venlafaxine XR (EFFEXOR-XR) 75 MG 24 hr capsule, TAKE 1 CAPSULE BY MOUTH EVERY DAY, Disp: 90 capsule, Rfl: 1  •  HYDROcodone-acetaminophen (NORCO) 5-325 MG per tablet, Take 1 tablet by mouth Every 6  "(Six) Hours As Needed for Mild Pain  or Moderate Pain ., Disp: 20 tablet, Rfl: 0    Allergies:  Methylprednisolone and Zolpidem tartrate      Objective   Vital Signs:   Vitals:    03/22/22 0939   BP: 118/77   BP Location: Right arm   Patient Position: Sitting   Pulse: 66   Temp: 98 °F (36.7 °C)   TempSrc: Oral   Weight: 59.4 kg (131 lb)   Height: 153.7 cm (60.51\")       Physical Exam  Constitutional:       Appearance: Normal appearance.   HENT:      Head: Normocephalic and atraumatic.   Eyes:      Extraocular Movements: Extraocular movements intact.      Conjunctiva/sclera: Conjunctivae normal.   Cardiovascular:      Comments: Normal capillary refill distal to left wrist  Pulmonary:      Effort: Pulmonary effort is normal. No respiratory distress.   Musculoskeletal:         General: Swelling (Radial wrist, localized), tenderness (Diffuse tenderness over the wrist, ulnar > radial, edema over the radial wrist, positive TTP over the anatomical snuffbox) and deformity (Marked ulnar deviation) present. Normal range of motion.      Comments:  strength in left hand decreased secondary to pain   Skin:     General: Skin is warm and dry.   Neurological:      General: No focal deficit present.      Mental Status: She is alert and oriented to person, place, and time.   Psychiatric:         Mood and Affect: Mood normal.         Behavior: Behavior normal.         Thought Content: Thought content normal.         Judgment: Judgment normal.             Assessment and Plan    Diagnoses and all orders for this visit:    1. Left wrist pain (Primary)  Assessment & Plan:  Left wrist with swelling and diffuse tenderness that has an obvious deformity.  She is going to need Orthopedic consultation today ideally.  I am sending her down for a stat left wrist x-ray and will then have her return to the room.  X-ray negative for obvious fracture or dislocation.  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  Due to the her anatomical snuffbox tenderness to palpation, I do have concern for a possible occult scaphoid fracture.  I will send in some hydrocodone/APAP for her to use for pain and put her in a thumb spica splint for the next 2 weeks.  We will call her in 2 weeks to recheck a left wrist x-ray to assess for occult fracture.  If at any time, she is unable to tolerate the thumb spica splint due to the osteoarthritic deviation of the wrist, she may call back to clinic and we will get her in with an orthopedist for evaluation.    Orders:  -     XR Wrist 2 View Left; Future  -     HYDROcodone-acetaminophen (NORCO) 5-325 MG per tablet; Take 1 tablet by mouth Every 6 (Six) Hours As Needed for Mild Pain  or Moderate Pain .  Dispense: 20 tablet; Refill: 0  -     Splint    2. Tenderness of anatomical snuffbox    3. Fall from standing, initial encounter    4. Swelling of left wrist      Follow Up   Return if symptoms worsen or fail to improve, for Or as scheduled 5/19/2022.  Patient was given instructions and counseling regarding her condition or for health maintenance advice. Please see specific information pulled into the AVS if appropriate.

## 2022-03-22 NOTE — ASSESSMENT & PLAN NOTE
Left wrist with swelling and diffuse tenderness that has an obvious deformity.  She is going to need Orthopedic consultation today ideally.  I am sending her down for a stat left wrist x-ray and will then have her return to the room.  X-ray negative for obvious fracture or dislocation.  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  Due to the her anatomical snuffbox tenderness to palpation, I do have concern for a possible occult scaphoid fracture.  I will send in some hydrocodone/APAP for her to use for pain and put her in a thumb spica splint for the next 2 weeks.  We will call her in 2 weeks to recheck a left wrist x-ray to assess for occult fracture.  If at any time, she is unable to tolerate the thumb spica splint due to the osteoarthritic deviation of the wrist, she may call back to clinic and we will get her in with an orthopedist for evaluation.

## 2022-03-29 RX ORDER — LEVOTHYROXINE SODIUM 88 UG/1
TABLET ORAL
Qty: 90 TABLET | Refills: 0 | Status: SHIPPED | OUTPATIENT
Start: 2022-03-29 | End: 2022-05-19 | Stop reason: SDUPTHER

## 2022-04-08 ENCOUNTER — TELEPHONE (OUTPATIENT)
Dept: FAMILY MEDICINE CLINIC | Age: 77
End: 2022-04-08

## 2022-04-08 DIAGNOSIS — M25.532 LEFT WRIST PAIN: Primary | ICD-10-CM

## 2022-04-08 NOTE — TELEPHONE ENCOUNTER
----- Message from Chery Wright MD sent at 3/22/2022 11:12 AM EDT -----  Please call to have pt come in for repeat left wrist x-ray, dx left wrist pain and swelling, possible scaphoid fracture.  Please pend order to me.  Thanks, LINDA

## 2022-04-13 RX ORDER — VENLAFAXINE HYDROCHLORIDE 150 MG/1
CAPSULE, EXTENDED RELEASE ORAL
Qty: 90 CAPSULE | Refills: 0 | Status: SHIPPED | OUTPATIENT
Start: 2022-04-13 | End: 2022-05-19 | Stop reason: SDUPTHER

## 2022-04-14 ENCOUNTER — HOSPITAL ENCOUNTER (OUTPATIENT)
Dept: GENERAL RADIOLOGY | Facility: HOSPITAL | Age: 77
Discharge: HOME | End: 2022-04-14
Admitting: FAMILY MEDICINE

## 2022-04-14 DIAGNOSIS — M25.532 LEFT WRIST PAIN: ICD-10-CM

## 2022-04-14 PROCEDURE — 73110 X-RAY EXAM OF WRIST: CPT

## 2022-04-15 DIAGNOSIS — M25.532 LEFT WRIST PAIN: Primary | ICD-10-CM

## 2022-05-19 ENCOUNTER — OFFICE VISIT (OUTPATIENT)
Dept: FAMILY MEDICINE CLINIC | Age: 77
End: 2022-05-19

## 2022-05-19 VITALS
WEIGHT: 126 LBS | DIASTOLIC BLOOD PRESSURE: 71 MMHG | SYSTOLIC BLOOD PRESSURE: 110 MMHG | BODY MASS INDEX: 23.79 KG/M2 | HEIGHT: 61 IN | HEART RATE: 66 BPM

## 2022-05-19 DIAGNOSIS — F33.1 MAJOR DEPRESSIVE DISORDER, RECURRENT EPISODE, MODERATE DEGREE: ICD-10-CM

## 2022-05-19 DIAGNOSIS — Z11.59 ENCOUNTER FOR SCREENING FOR OTHER VIRAL DISEASES: ICD-10-CM

## 2022-05-19 DIAGNOSIS — M81.0 AGE-RELATED OSTEOPOROSIS WITHOUT CURRENT PATHOLOGICAL FRACTURE: ICD-10-CM

## 2022-05-19 DIAGNOSIS — E78.2 MIXED HYPERLIPIDEMIA: ICD-10-CM

## 2022-05-19 DIAGNOSIS — E03.9 ACQUIRED HYPOTHYROIDISM: ICD-10-CM

## 2022-05-19 DIAGNOSIS — M25.532 LEFT WRIST PAIN: ICD-10-CM

## 2022-05-19 DIAGNOSIS — I10 PRIMARY HYPERTENSION: Primary | ICD-10-CM

## 2022-05-19 PROCEDURE — 99214 OFFICE O/P EST MOD 30 MIN: CPT | Performed by: FAMILY MEDICINE

## 2022-05-19 RX ORDER — LEVOTHYROXINE SODIUM 88 UG/1
88 TABLET ORAL DAILY
Qty: 90 TABLET | Refills: 1 | Status: SHIPPED | OUTPATIENT
Start: 2022-05-19 | End: 2022-12-01 | Stop reason: SDUPTHER

## 2022-05-19 RX ORDER — ALENDRONATE SODIUM 70 MG/1
70 TABLET ORAL
Qty: 12 TABLET | Refills: 3 | Status: SHIPPED | OUTPATIENT
Start: 2022-05-19

## 2022-05-19 RX ORDER — LISINOPRIL AND HYDROCHLOROTHIAZIDE 12.5; 1 MG/1; MG/1
1 TABLET ORAL DAILY
Qty: 90 TABLET | Refills: 1 | Status: SHIPPED | OUTPATIENT
Start: 2022-05-19 | End: 2022-12-01 | Stop reason: SDUPTHER

## 2022-05-19 RX ORDER — VENLAFAXINE HYDROCHLORIDE 150 MG/1
150 CAPSULE, EXTENDED RELEASE ORAL DAILY
Qty: 90 CAPSULE | Refills: 0 | Status: SHIPPED | OUTPATIENT
Start: 2022-05-19 | End: 2022-10-17

## 2022-05-19 RX ORDER — VENLAFAXINE HYDROCHLORIDE 75 MG/1
75 CAPSULE, EXTENDED RELEASE ORAL DAILY
Qty: 90 CAPSULE | Refills: 1 | Status: SHIPPED | OUTPATIENT
Start: 2022-05-19 | End: 2022-12-01 | Stop reason: SDUPTHER

## 2022-05-19 RX ORDER — TRAZODONE HYDROCHLORIDE 100 MG/1
50-100 TABLET ORAL NIGHTLY PRN
Qty: 45 TABLET | Refills: 1 | Status: SHIPPED | OUTPATIENT
Start: 2022-05-19 | End: 2022-12-01 | Stop reason: SDUPTHER

## 2022-05-19 NOTE — ASSESSMENT & PLAN NOTE
Stable on Fosamax 70 mg weekly.  Refills sent.  DEXA is reviewed and UTD.  Will be due for repeat 9/2022.

## 2022-05-19 NOTE — PROGRESS NOTES
"Chief Complaint  Depression (6 month follow up )    Subjective          Lena White presents to [facility] today for routine f/u of chronic issues.    I saw her back last month for L wrist pain after she fell on her driveway.  There was some concern for possible scaphoid fracture but this turned out to be negative on follow-up x-ray.  She is continuing to have a lot of problems with the L wrist.  It is deviated to the ulnar aspect.  Pain if she does not wear the brace at night.  She has stopped wearing it during the day for most part (unless she is doing something like mowing the lawn on her zero-turn).  She has an appt with Dr. Gonzalez next month.  She has had carpal tunnel release in the L hand previously.    She is on lisinopril/HCTZ for hypertension.  Her blood pressure has been well controlled.  She denies chest pain, palpitations, or SOB.  She is no longer taking pravastatin for hyperlipidemia (caused myalgias).      She is on Synthroid for hypothyroidism.  She denies cold/heat intolerance, changes in hair or skin.      She is on Effexor 225 mg for depression.  She is on trazodone for insomnia.  Mood has been \"fine.\"   She denies depressed mood or anhedonia, anxiety or panic attacks.        She is on Fosamax for osteoporosis.  Last DEXA was 9/2020 and this showed improved osteopenia.      Current Outpatient Medications:   •  alendronate (FOSAMAX) 70 MG tablet, Take 1 tablet by mouth Every 7 (Seven) Days. Takes on sundays, Disp: 12 tablet, Rfl: 3  •  HYDROcodone-acetaminophen (NORCO) 5-325 MG per tablet, Take 1 tablet by mouth Every 6 (Six) Hours As Needed for Mild Pain  or Moderate Pain ., Disp: 20 tablet, Rfl: 0  •  levothyroxine (SYNTHROID, LEVOTHROID) 88 MCG tablet, Take 1 tablet by mouth Daily., Disp: 90 tablet, Rfl: 1  •  lisinopril-hydrochlorothiazide (PRINZIDE,ZESTORETIC) 10-12.5 MG per tablet, Take 1 tablet by mouth Daily., Disp: 90 tablet, Rfl: 1  •  multivitamin with " "minerals tablet tablet, Take 2 tablets by mouth Daily. Eye vitamin, Disp: , Rfl:   •  Nutritional Supplements (VITAMIN D BOOSTER PO), Take  by mouth Daily., Disp: , Rfl:   •  Omega-3 Fatty Acids (FISH OIL CONCENTRATE PO), Take  by mouth Daily., Disp: , Rfl:   •  traZODone (DESYREL) 100 MG tablet, Take 0.5-1 tablets by mouth At Night As Needed for Sleep., Disp: 45 tablet, Rfl: 1  •  venlafaxine XR (EFFEXOR-XR) 150 MG 24 hr capsule, Take 1 capsule by mouth Daily. Take with 75 mg (225 mg total), Disp: 90 capsule, Rfl: 0  •  venlafaxine XR (EFFEXOR-XR) 75 MG 24 hr capsule, Take 1 capsule by mouth Daily. Take with 150 mg (225 mg total), Disp: 90 capsule, Rfl: 1    Allergies:  Methylprednisolone and Zolpidem tartrate      Objective   Vital Signs:   Vitals:    05/19/22 0956   BP: 110/71   BP Location: Left arm   Patient Position: Sitting   Pulse: 66   Weight: 57.2 kg (126 lb)   Height: 153.7 cm (60.51\")       Physical Exam  Vitals reviewed.   Constitutional:       General: She is not in acute distress.     Appearance: Normal appearance. She is well-developed.   HENT:      Head: Normocephalic and atraumatic.      Right Ear: External ear normal.      Left Ear: External ear normal.   Eyes:      Extraocular Movements: Extraocular movements intact.      Conjunctiva/sclera: Conjunctivae normal.      Pupils: Pupils are equal, round, and reactive to light.   Cardiovascular:      Rate and Rhythm: Normal rate and regular rhythm.      Heart sounds: No murmur heard.  Pulmonary:      Effort: Pulmonary effort is normal.      Breath sounds: Normal breath sounds. No wheezing, rhonchi or rales.   Abdominal:      General: Bowel sounds are normal. There is no distension.      Palpations: Abdomen is soft.      Tenderness: There is no abdominal tenderness.   Musculoskeletal:         General: Normal range of motion.   Neurological:      Mental Status: She is alert.   Psychiatric:         Mood and Affect: Affect normal.             Assessment " and Plan    Diagnoses and all orders for this visit:    1. Primary hypertension (Primary)  Assessment & Plan:  Stable on lisinopril/HCTZ 10/12.5 mg daily.  Refills sent.  Checking labs.    Orders:  -     Lipid Panel; Future  -     Comprehensive Metabolic Panel; Future  -     CBC & Differential; Future  -     lisinopril-hydrochlorothiazide (PRINZIDE,ZESTORETIC) 10-12.5 MG per tablet; Take 1 tablet by mouth Daily.  Dispense: 90 tablet; Refill: 1    2. Mixed hyperlipidemia  Assessment & Plan:  Unable to tolerate statins.  Checking labs.      3. Acquired hypothyroidism  Assessment & Plan:  Stable on levothyroxine 88 mcg daily.  Refills sent.  Checking labs.    Orders:  -     TSH; Future  -     levothyroxine (SYNTHROID, LEVOTHROID) 88 MCG tablet; Take 1 tablet by mouth Daily.  Dispense: 90 tablet; Refill: 1    4. Major depressive disorder, recurrent episode, moderate degree (HCC)  Assessment & Plan:  Stable on Effexor 225 mg daily.  Refills sent.  As well as for trazodone.  Continue to monitor.    Orders:  -     traZODone (DESYREL) 100 MG tablet; Take 0.5-1 tablets by mouth At Night As Needed for Sleep.  Dispense: 45 tablet; Refill: 1  -     venlafaxine XR (EFFEXOR-XR) 150 MG 24 hr capsule; Take 1 capsule by mouth Daily. Take with 75 mg (225 mg total)  Dispense: 90 capsule; Refill: 0  -     venlafaxine XR (EFFEXOR-XR) 75 MG 24 hr capsule; Take 1 capsule by mouth Daily. Take with 150 mg (225 mg total)  Dispense: 90 capsule; Refill: 1    5. Left wrist pain  Assessment & Plan:  Ongoing.  Has appt scheduled with Ortho.      6. Age-related osteoporosis without current pathological fracture  Assessment & Plan:  Stable on Fosamax 70 mg weekly.  Refills sent.  DEXA is reviewed and UTD.  Will be due for repeat 9/2022.    Orders:  -     alendronate (FOSAMAX) 70 MG tablet; Take 1 tablet by mouth Every 7 (Seven) Days. Takes on sundays  Dispense: 12 tablet; Refill: 3    7. Encounter for screening for other viral diseases  -      Hepatitis C Antibody; Future      Follow Up   Return in about 6 months (around 11/19/2022) for Medicare Wellness.  Patient was given instructions and counseling regarding her condition or for health maintenance advice. Please see specific information pulled into the AVS if appropriate.

## 2022-05-25 ENCOUNTER — LAB (OUTPATIENT)
Dept: LAB | Facility: HOSPITAL | Age: 77
End: 2022-05-25

## 2022-05-25 DIAGNOSIS — E03.9 ACQUIRED HYPOTHYROIDISM: ICD-10-CM

## 2022-05-25 DIAGNOSIS — I10 PRIMARY HYPERTENSION: ICD-10-CM

## 2022-05-25 DIAGNOSIS — Z11.59 ENCOUNTER FOR SCREENING FOR OTHER VIRAL DISEASES: ICD-10-CM

## 2022-05-25 LAB
ALBUMIN SERPL-MCNC: 4.3 G/DL (ref 3.5–5.2)
ALBUMIN/GLOB SERPL: 1.5 G/DL
ALP SERPL-CCNC: 100 U/L (ref 39–117)
ALT SERPL W P-5'-P-CCNC: 12 U/L (ref 1–33)
ANION GAP SERPL CALCULATED.3IONS-SCNC: 13.3 MMOL/L (ref 5–15)
AST SERPL-CCNC: 18 U/L (ref 1–32)
BASOPHILS # BLD AUTO: 0.07 10*3/MM3 (ref 0–0.2)
BASOPHILS NFR BLD AUTO: 0.8 % (ref 0–1.5)
BILIRUB SERPL-MCNC: 0.3 MG/DL (ref 0–1.2)
BUN SERPL-MCNC: 13 MG/DL (ref 8–23)
BUN/CREAT SERPL: 17.1 (ref 7–25)
CALCIUM SPEC-SCNC: 9.5 MG/DL (ref 8.6–10.5)
CHLORIDE SERPL-SCNC: 103 MMOL/L (ref 98–107)
CHOLEST SERPL-MCNC: 219 MG/DL (ref 0–200)
CO2 SERPL-SCNC: 24.7 MMOL/L (ref 22–29)
CREAT SERPL-MCNC: 0.76 MG/DL (ref 0.57–1)
DEPRECATED RDW RBC AUTO: 40.9 FL (ref 37–54)
EGFRCR SERPLBLD CKD-EPI 2021: 80.3 ML/MIN/1.73
EOSINOPHIL # BLD AUTO: 0.16 10*3/MM3 (ref 0–0.4)
EOSINOPHIL NFR BLD AUTO: 1.8 % (ref 0.3–6.2)
ERYTHROCYTE [DISTWIDTH] IN BLOOD BY AUTOMATED COUNT: 12.7 % (ref 12.3–15.4)
GLOBULIN UR ELPH-MCNC: 2.8 GM/DL
GLUCOSE SERPL-MCNC: 98 MG/DL (ref 65–99)
HCT VFR BLD AUTO: 39.8 % (ref 34–46.6)
HCV AB SER DONR QL: NORMAL
HDLC SERPL-MCNC: 64 MG/DL (ref 40–60)
HGB BLD-MCNC: 13.8 G/DL (ref 12–15.9)
IMM GRANULOCYTES # BLD AUTO: 0.01 10*3/MM3 (ref 0–0.05)
IMM GRANULOCYTES NFR BLD AUTO: 0.1 % (ref 0–0.5)
LDLC SERPL CALC-MCNC: 130 MG/DL (ref 0–100)
LDLC/HDLC SERPL: 1.97 {RATIO}
LYMPHOCYTES # BLD AUTO: 4.38 10*3/MM3 (ref 0.7–3.1)
LYMPHOCYTES NFR BLD AUTO: 49.5 % (ref 19.6–45.3)
MCH RBC QN AUTO: 30.5 PG (ref 26.6–33)
MCHC RBC AUTO-ENTMCNC: 34.7 G/DL (ref 31.5–35.7)
MCV RBC AUTO: 88.1 FL (ref 79–97)
MONOCYTES # BLD AUTO: 0.89 10*3/MM3 (ref 0.1–0.9)
MONOCYTES NFR BLD AUTO: 10.1 % (ref 5–12)
NEUTROPHILS NFR BLD AUTO: 3.34 10*3/MM3 (ref 1.7–7)
NEUTROPHILS NFR BLD AUTO: 37.7 % (ref 42.7–76)
NRBC BLD AUTO-RTO: 0 /100 WBC (ref 0–0.2)
PLATELET # BLD AUTO: 379 10*3/MM3 (ref 140–450)
PMV BLD AUTO: 10.5 FL (ref 6–12)
POTASSIUM SERPL-SCNC: 3.8 MMOL/L (ref 3.5–5.2)
PROT SERPL-MCNC: 7.1 G/DL (ref 6–8.5)
RBC # BLD AUTO: 4.52 10*6/MM3 (ref 3.77–5.28)
SODIUM SERPL-SCNC: 141 MMOL/L (ref 136–145)
TRIGL SERPL-MCNC: 145 MG/DL (ref 0–150)
TSH SERPL DL<=0.05 MIU/L-ACNC: 2.01 UIU/ML (ref 0.27–4.2)
VLDLC SERPL-MCNC: 25 MG/DL (ref 5–40)
WBC NRBC COR # BLD: 8.85 10*3/MM3 (ref 3.4–10.8)

## 2022-05-25 PROCEDURE — 84443 ASSAY THYROID STIM HORMONE: CPT

## 2022-05-25 PROCEDURE — 80061 LIPID PANEL: CPT

## 2022-05-25 PROCEDURE — 36415 COLL VENOUS BLD VENIPUNCTURE: CPT

## 2022-05-25 PROCEDURE — 85025 COMPLETE CBC W/AUTO DIFF WBC: CPT

## 2022-05-25 PROCEDURE — 86803 HEPATITIS C AB TEST: CPT

## 2022-05-25 PROCEDURE — 80053 COMPREHEN METABOLIC PANEL: CPT

## 2022-06-22 ENCOUNTER — OFFICE VISIT (OUTPATIENT)
Dept: ORTHOPEDIC SURGERY | Facility: CLINIC | Age: 77
End: 2022-06-22

## 2022-06-22 VITALS — HEIGHT: 60 IN | TEMPERATURE: 97.6 F | BODY MASS INDEX: 24.94 KG/M2 | WEIGHT: 127 LBS

## 2022-06-22 DIAGNOSIS — G56.01 CARPAL TUNNEL SYNDROME ON RIGHT: ICD-10-CM

## 2022-06-22 DIAGNOSIS — G56.02 CARPAL TUNNEL SYNDROME ON LEFT: ICD-10-CM

## 2022-06-22 DIAGNOSIS — M19.042 PRIMARY OSTEOARTHRITIS OF LEFT HAND: ICD-10-CM

## 2022-06-22 DIAGNOSIS — M18.12 PRIMARY OSTEOARTHRITIS OF FIRST CARPOMETACARPAL JOINT OF LEFT HAND: Primary | ICD-10-CM

## 2022-06-22 PROCEDURE — 99204 OFFICE O/P NEW MOD 45 MIN: CPT | Performed by: ORTHOPAEDIC SURGERY

## 2022-06-22 PROCEDURE — 20600 DRAIN/INJ JOINT/BURSA W/O US: CPT | Performed by: ORTHOPAEDIC SURGERY

## 2022-06-22 RX ADMIN — METHYLPREDNISOLONE ACETATE 160 MG: 80 INJECTION, SUSPENSION INTRA-ARTICULAR; INTRALESIONAL; INTRAMUSCULAR; SOFT TISSUE at 15:57

## 2022-06-24 ENCOUNTER — TELEPHONE (OUTPATIENT)
Dept: ORTHOPEDIC SURGERY | Facility: CLINIC | Age: 77
End: 2022-06-24

## 2022-06-24 DIAGNOSIS — M25.532 LEFT WRIST PAIN: Primary | ICD-10-CM

## 2022-06-24 RX ORDER — PYRAZINAMIDE 500 MG/1
TABLET ORAL
Qty: 20 TABLET | Refills: 0 | Status: SHIPPED | OUTPATIENT
Start: 2022-06-24 | End: 2022-12-01

## 2022-06-24 RX ORDER — PYRAZINAMIDE 500 MG/1
1 TABLET ORAL SEE ADMIN INSTRUCTIONS
Qty: 20 TABLET | Refills: 0 | Status: CANCELLED | OUTPATIENT
Start: 2022-06-24

## 2022-06-24 NOTE — TELEPHONE ENCOUNTER
Provider: DR CHÁVEZ    Caller: VICTORINA    Relationship to Patient: SELF    Phone Number: [phone]    Reason for Call: PT CALLED IN STATING SHE HAS INJECTION ON WED 6/22/22 IN HER LEFT WRIST , SHE IS HAVING A LOT OF PAIN SHOOTING THROUGH THE ARM.

## 2022-06-24 NOTE — TELEPHONE ENCOUNTER
Your advice is correct.  Go ahead and send the Tylenol with Codeine No. 3 as indicated by you and I will sign off on it electronically.  Thank you

## 2022-06-24 NOTE — TELEPHONE ENCOUNTER
SPOKE WITH THE PATIENT AND EXPLAINED THAT IT IS MOST LIKELY A CORTISONE FLARE-UP. SHE STATES THAT ICE HAS HELPED BUT IT IS STILL EXCRUCIATING. SHE CANNOT TAKE IBUPROFEN. I EXPLAINED THAT WE MAY BE ABLE TO SEND IN A FEW TYLENOL #3. SHE WAS CONCERNED THAT WHAT WAS INJECTED INTO HER WRIST WAS THE HYALURONIC ACID WHICH SHE IS ALLERGIC TO. I REASSURED HER THAT IT WAS NOT AND THAT IT WAS DEPO-MEDROL.  PLEASE ADVISE REGARDING TYLENOL #3 ONE TABLET EVERY 6-8HRS. PRN SEVERE PAIN #20 TO BE SENT TO [facility] IN [place], [place].  SHE WAS ALSO INSTRUCTED TO CONTINUE ICING THE WRIST/RJ

## 2022-07-07 PROBLEM — G56.01 CARPAL TUNNEL SYNDROME ON RIGHT: Status: ACTIVE | Noted: 2022-07-07

## 2022-07-07 PROBLEM — M18.12 PRIMARY OSTEOARTHRITIS OF FIRST CARPOMETACARPAL JOINT OF LEFT HAND: Status: ACTIVE | Noted: 2022-07-07

## 2022-07-07 PROBLEM — G56.02 CARPAL TUNNEL SYNDROME ON LEFT: Status: ACTIVE | Noted: 2022-07-07

## 2022-07-07 PROBLEM — M19.042 PRIMARY OSTEOARTHRITIS OF LEFT HAND: Status: ACTIVE | Noted: 2022-07-07

## 2022-07-08 RX ORDER — METHYLPREDNISOLONE ACETATE 80 MG/ML
160 INJECTION, SUSPENSION INTRA-ARTICULAR; INTRALESIONAL; INTRAMUSCULAR; SOFT TISSUE
Status: COMPLETED | OUTPATIENT
Start: 2022-06-22 | End: 2022-06-22

## 2022-08-23 DIAGNOSIS — F33.1 MAJOR DEPRESSIVE DISORDER, RECURRENT EPISODE, MODERATE DEGREE: ICD-10-CM

## 2022-08-23 RX ORDER — TRAZODONE HYDROCHLORIDE 100 MG/1
TABLET ORAL
Qty: 45 TABLET | Refills: 1 | OUTPATIENT
Start: 2022-08-23

## 2022-10-15 DIAGNOSIS — F33.1 MAJOR DEPRESSIVE DISORDER, RECURRENT EPISODE, MODERATE DEGREE: ICD-10-CM

## 2022-10-17 RX ORDER — VENLAFAXINE HYDROCHLORIDE 150 MG/1
CAPSULE, EXTENDED RELEASE ORAL
Qty: 90 CAPSULE | Refills: 0 | Status: SHIPPED | OUTPATIENT
Start: 2022-10-17 | End: 2022-12-01 | Stop reason: SDUPTHER

## 2022-12-01 ENCOUNTER — TELEPHONE (OUTPATIENT)
Dept: FAMILY MEDICINE CLINIC | Age: 77
End: 2022-12-01

## 2022-12-01 ENCOUNTER — OFFICE VISIT (OUTPATIENT)
Dept: FAMILY MEDICINE CLINIC | Age: 77
End: 2022-12-01

## 2022-12-01 VITALS
DIASTOLIC BLOOD PRESSURE: 70 MMHG | WEIGHT: 132.6 LBS | HEART RATE: 62 BPM | HEIGHT: 60 IN | BODY MASS INDEX: 26.03 KG/M2 | SYSTOLIC BLOOD PRESSURE: 113 MMHG

## 2022-12-01 DIAGNOSIS — E03.9 ACQUIRED HYPOTHYROIDISM: ICD-10-CM

## 2022-12-01 DIAGNOSIS — E78.2 MIXED HYPERLIPIDEMIA: ICD-10-CM

## 2022-12-01 DIAGNOSIS — Z00.00 ANNUAL PHYSICAL EXAM: Primary | ICD-10-CM

## 2022-12-01 DIAGNOSIS — Z78.0 MENOPAUSE: ICD-10-CM

## 2022-12-01 DIAGNOSIS — Z12.31 SCREENING MAMMOGRAM, ENCOUNTER FOR: ICD-10-CM

## 2022-12-01 DIAGNOSIS — I10 PRIMARY HYPERTENSION: ICD-10-CM

## 2022-12-01 DIAGNOSIS — F33.1 MAJOR DEPRESSIVE DISORDER, RECURRENT EPISODE, MODERATE DEGREE: ICD-10-CM

## 2022-12-01 PROBLEM — Z83.2 FAMILY HISTORY OF SARCOIDOSIS: Status: ACTIVE | Noted: 2022-12-01

## 2022-12-01 PROBLEM — Z83.2 FAMILY HISTORY OF SARCOIDOSIS: Status: RESOLVED | Noted: 2022-12-01 | Resolved: 2022-12-01

## 2022-12-01 PROCEDURE — G0439 PPPS, SUBSEQ VISIT: HCPCS | Performed by: FAMILY MEDICINE

## 2022-12-01 PROCEDURE — 99214 OFFICE O/P EST MOD 30 MIN: CPT | Performed by: FAMILY MEDICINE

## 2022-12-01 PROCEDURE — 1170F FXNL STATUS ASSESSED: CPT | Performed by: FAMILY MEDICINE

## 2022-12-01 PROCEDURE — 1159F MED LIST DOCD IN RCRD: CPT | Performed by: FAMILY MEDICINE

## 2022-12-01 RX ORDER — TRAZODONE HYDROCHLORIDE 100 MG/1
50-100 TABLET ORAL NIGHTLY PRN
Qty: 45 TABLET | Refills: 1 | Status: SHIPPED | OUTPATIENT
Start: 2022-12-01

## 2022-12-01 RX ORDER — VENLAFAXINE HYDROCHLORIDE 150 MG/1
150 CAPSULE, EXTENDED RELEASE ORAL DAILY
Qty: 90 CAPSULE | Refills: 0 | Status: SHIPPED | OUTPATIENT
Start: 2022-12-01

## 2022-12-01 RX ORDER — VENLAFAXINE HYDROCHLORIDE 75 MG/1
75 CAPSULE, EXTENDED RELEASE ORAL DAILY
Qty: 90 CAPSULE | Refills: 1 | Status: SHIPPED | OUTPATIENT
Start: 2022-12-01

## 2022-12-01 RX ORDER — LISINOPRIL AND HYDROCHLOROTHIAZIDE 12.5; 1 MG/1; MG/1
1 TABLET ORAL DAILY
Qty: 90 TABLET | Refills: 1 | Status: SHIPPED | OUTPATIENT
Start: 2022-12-01

## 2022-12-01 RX ORDER — LEVOTHYROXINE SODIUM 88 UG/1
88 TABLET ORAL DAILY
Qty: 90 TABLET | Refills: 1 | Status: SHIPPED | OUTPATIENT
Start: 2022-12-01

## 2022-12-01 NOTE — ASSESSMENT & PLAN NOTE
Quite stable on the venlafaxine to 25 mg daily with trazodone 50 to 100 mg nightly as needed.  Refill sent as below.  Continue to monitor.

## 2022-12-01 NOTE — ASSESSMENT & PLAN NOTE
Stable on levothyroxine 88 mcg daily.  She is due for labs, which have been ordered today.  Refills were sent in the meantime.

## 2022-12-01 NOTE — ASSESSMENT & PLAN NOTE
Blood pressure is currently running at goal.  Continue lisinopril/HCTZ 10/12.5 mg daily.  Refills sent.  Checking labs.

## 2022-12-01 NOTE — TELEPHONE ENCOUNTER
Please let Lena know that [facility] does not have a flu shot on file for her for this year.  If she would like to stop by our office to get this, she can just walk-in to the  at any time or alternatively any of the pharmacies in town would be able to get that for her.  Thanks, LINDA

## 2022-12-01 NOTE — PROGRESS NOTES
"The ABCs of the Annual Wellness Visit  Subsequent Medicare Wellness Visit    Chief Complaint   Patient presents with   • Medicare Wellness-subsequent      Subjective    History of Present Illness:  Lena White is a 76 y.o. female who presents for a Subsequent Medicare Wellness Visit.     She is reportedly UTD on colon cancer screening; reports Dr. Coker recommended against colonoscopy when she saw him last year and instead ordered hemoccult screening.  Pap smear is no longer indicated by age and history.  She is due for mammogram, last done 8/2021 and this was normal.  She is due for DEXA, last done 9/2020 and this showed osteoporosis.  Currently on Fosamax.  She is UTD on Pneumovax (11/2016), [id number] (11/2015), Zostavax (8/2017).  She is due for COVID, Td.  Thinks she already got the flu shot at [facility] in 9/2022.  She thinks she had Shingrix as well.  She is due for routine labs including CMP and TSH.     She is finally out of the cast from her L wrist fracture.  She still has some residual pain but it is doing pretty well over all.    She is on lisinopril/HCTZ for hypertension.  Her blood pressure has been well controlled.  No chest pain, palpitations, or SOB.  She is no longer taking pravastatin for hyperlipidemia (caused myalgias).      She is on Synthroid for hypothyroidism.  She denies cold/heat intolerance, changes in hair or skin.      She is on venlafaxine 225 mg for depression.  She is on trazodone for insomnia.  Mood has been \"fine.\"   She denies depressed mood or anhedonia, anxiety or panic attacks.        She is on Fosamax for osteoporosis.  Last DEXA was 9/2020 and this showed improved osteopenia.    The following portions of the patient's history were reviewed and   updated as appropriate: allergies, current medications, past family history, past medical history, past social history, past surgical history and problem list.    Compared to one year ago, the patient feels her physical   health " is the same.    Compared to one year ago, the patient feels her mental   health is the same.    Recent Hospitalizations:  She was not admitted to the hospital during the last year.       Current Medical Providers:  Patient Care Team:  Chery Wright MD as PCP - General (Family Medicine)  Cady Dunne MD as Consulting Physician (General Surgery)  Marisa Haywood MD PhD as Consulting Physician (Hematology and Oncology)    Outpatient Medications Prior to Visit   Medication Sig Dispense Refill   • alendronate (FOSAMAX) 70 MG tablet Take 1 tablet by mouth Every 7 (Seven) Days. Takes on sundays 12 tablet 3   • multivitamin with minerals tablet tablet Take 2 tablets by mouth Daily. Eye vitamin     • Nutritional Supplements (VITAMIN D BOOSTER PO) Take  by mouth Daily.     • Omega-3 Fatty Acids (FISH OIL CONCENTRATE PO) Take  by mouth Daily.     • levothyroxine (SYNTHROID, LEVOTHROID) 88 MCG tablet Take 1 tablet by mouth Daily. 90 tablet 1   • lisinopril-hydrochlorothiazide (PRINZIDE,ZESTORETIC) 10-12.5 MG per tablet Take 1 tablet by mouth Daily. 90 tablet 1   • traZODone (DESYREL) 100 MG tablet Take 0.5-1 tablets by mouth At Night As Needed for Sleep. 45 tablet 1   • venlafaxine XR (EFFEXOR-XR) 150 MG 24 hr capsule TAKE 1 CAPSULES BY MOUTH DAILY 90 capsule 0   • venlafaxine XR (EFFEXOR-XR) 75 MG 24 hr capsule Take 1 capsule by mouth Daily. Take with 150 mg (225 mg total) 90 capsule 1   • acetaminophen-codeine (TYLENOL/CODEINE #3) 300-30 MG per tablet Take one tablet by mouth every 6-8 hours prn pain 20 tablet 0   • HYDROcodone-acetaminophen (NORCO) 5-325 MG per tablet Take 1 tablet by mouth Every 6 (Six) Hours As Needed for Mild Pain  or Moderate Pain . 20 tablet 0     No facility-administered medications prior to visit.       No opioid medication identified on active medication list. I have reviewed chart for other potential  high risk medication/s and harmful drug interactions in the  elderly.          Aspirin is not on active medication list.  Aspirin use is not indicated based on review of current medical condition/s. Risk of harm outweighs potential benefits.  .    Patient Active Problem List   Diagnosis   • Acquired hypothyroidism   • Mixed hyperlipidemia   • Major depressive disorder, recurrent episode, moderate degree (HCC)   • Primary hypertension   • Acquired absence of spleen   • Age-related osteoporosis without current pathological fracture   • Anxiety   • Ventral hernia without obstruction or gangrene   • Annual physical exam   • Memory loss   • Cervical radiculopathy   • RBBB   • Seasonal allergic rhinitis   • Atypical ductal hyperplasia of left breast   • Abnormal mammogram   • Mass of lower outer quadrant of right breast   • Left wrist pain   • Carpal tunnel syndrome on right   • Primary osteoarthritis of first carpometacarpal joint of left hand   • Carpal tunnel syndrome on left   • Primary osteoarthritis of left hand   • Family history of sarcoidosis     Advance Care Planning  Advance Directive is on file.  ACP discussion was held with the patient during this visit. Patient has an advance directive in EMR which is still valid.     Review of Systems   Constitutional: Negative for chills, fatigue and fever.   HENT: Negative for congestion, hearing loss and rhinorrhea.    Eyes: Negative for pain and visual disturbance.   Respiratory: Negative for cough and shortness of breath.    Cardiovascular: Negative for chest pain and palpitations.   Gastrointestinal: Negative for abdominal pain, constipation, diarrhea, nausea and vomiting.   Genitourinary: Negative for difficulty urinating and dysuria.   Musculoskeletal: Negative for arthralgias and myalgias.   Neurological: Negative for weakness and numbness.   Psychiatric/Behavioral: Negative for dysphoric mood and sleep disturbance. The patient is not nervous/anxious.         Objective    Vitals:    12/01/22 1022   BP: 113/70   BP Location:  "Left arm   Patient Position: Sitting   Pulse: 62   Weight: 60.1 kg (132 lb 9.6 oz)   Height: 152.4 cm (60\")     Estimated body mass index is 25.9 kg/m² as calculated from the following:    Height as of this encounter: 152.4 cm (60\").    Weight as of this encounter: 60.1 kg (132 lb 9.6 oz).    BMI is >= 25 and <30. (Overweight) The following options were offered after discussion;: exercise counseling/recommendations and nutrition counseling/recommendations      Does the patient have evidence of cognitive impairment? No    Physical Exam  Vitals reviewed.   Constitutional:       General: She is not in acute distress.     Appearance: Normal appearance. She is well-developed.   HENT:      Head: Normocephalic and atraumatic.      Right Ear: External ear normal.      Left Ear: External ear normal.      Mouth/Throat:      Mouth: Mucous membranes are moist.   Eyes:      Extraocular Movements: Extraocular movements intact.      Conjunctiva/sclera: Conjunctivae normal.      Pupils: Pupils are equal, round, and reactive to light.   Cardiovascular:      Rate and Rhythm: Normal rate and regular rhythm.      Heart sounds: No murmur heard.  Pulmonary:      Effort: Pulmonary effort is normal.      Breath sounds: Normal breath sounds. No wheezing, rhonchi or rales.   Abdominal:      General: Bowel sounds are normal. There is no distension.      Palpations: Abdomen is soft.      Tenderness: There is no abdominal tenderness.   Musculoskeletal:         General: Normal range of motion.   Skin:     General: Skin is warm and dry.   Neurological:      Mental Status: She is alert and oriented to person, place, and time.      Deep Tendon Reflexes: Reflexes normal.   Psychiatric:         Mood and Affect: Mood and affect normal.         Behavior: Behavior normal.         Thought Content: Thought content normal.         Judgment: Judgment normal.                 HEALTH RISK ASSESSMENT    Smoking Status:  Social History     Tobacco Use   Smoking " Status Never   Smokeless Tobacco Never     Alcohol Consumption:  Social History     Substance and Sexual Activity   Alcohol Use Never     Fall Risk Screen:    KYLEADI Fall Risk Assessment was completed, and patient is at HIGH risk for falls. Assessment completed on:12/1/2022    Depression Screening:  PHQ-2/PHQ-9 Depression Screening 12/1/2022   Retired PHQ-9 Total Score -   Retired Total Score -   Little Interest or Pleasure in Doing Things 0-->not at all   Feeling Down, Depressed or Hopeless 0-->not at all   PHQ-9: Brief Depression Severity Measure Score 0       Health Habits and Functional and Cognitive Screening:  Functional & Cognitive Status 12/1/2022   Do you have difficulty preparing food and eating? No   Do you have difficulty bathing yourself, getting dressed or grooming yourself? No   Do you have difficulty using the toilet? No   Do you have difficulty moving around from place to place? No   Do you have trouble with steps or getting out of a bed or a chair? No   Current Diet Well Balanced Diet   Dental Exam Up to date   Eye Exam Up to date   Exercise (times per week) 4 times per week   Current Exercises Include Walking   Do you need help using the phone?  No   Are you deaf or do you have serious difficulty hearing?  Yes   Do you need help with transportation? No   Do you need help shopping? No   Do you need help preparing meals?  No   Do you need help with housework?  No   Do you need help with laundry? No   Do you need help taking your medications? No   Do you need help managing money? No   Do you ever drive or ride in a car without wearing a seat belt? No   Have you felt unusual stress, anger or loneliness in the last month? No   Who do you live with? Alone   If you need help, do you have trouble finding someone available to you? No   Have you been bothered in the last four weeks by sexual problems? No   Do you have difficulty concentrating, remembering or making decisions? Yes       Age-appropriate  Screening Schedule:  Refer to the list below for future screening recommendations based on patient's age, sex and/or medical conditions. Orders for these recommended tests are listed in the plan section. The patient has been provided with a written plan.    Health Maintenance   Topic Date Due   • TDAP/TD VACCINES (1 - Tdap) Never done   • ZOSTER VACCINE (1 of 2) 09/28/2017   • MAMMOGRAM  08/11/2022   • DXA SCAN  09/15/2022   • INFLUENZA VACCINE  03/31/2023 (Originally 8/1/2022)   • LIPID PANEL  05/25/2023              Assessment & Plan   CMS Preventative Services Quick Reference  Risk Factors Identified During Encounter  Immunizations Discussed/Encouraged (specific Immunizations; Tdap, Influenza, Shingrix and COVID19  The above risks/problems have been discussed with the patient.  Follow up actions/plans if indicated are seen below in the Assessment/Plan Section.  Pertinent information has been shared with the patient in the After Visit Summary.    Diagnoses and all orders for this visit:    1. Annual physical exam (Primary)  Assessment & Plan:  She is reportedly UTD on colon cancer screening; reports Dr. Coker recommended against colonoscopy when she saw him last year and instead ordered hemoccult screening.  Pap smear is no longer indicated by age and history.  She is due for mammogram, last done 8/2021 and this was normal; ordered.  She is due for DEXA, last done 9/2020 and this showed osteoporosis; ordered.  Currently on Fosamax.  She is UTD on Pneumovax (11/2016), Ktxksod59 (11/2015), Zostavax (8/2017).  She is due for COVID, Td.  COVID declines.  Tdap can be done at the pharmacy.  Thinks she already got the flu shot at [facility] in 9/2022; records requested.  She thinks she had Shingrix as well.  She is due for routine labs including CMP and TSH; ordered.      2. Primary hypertension  Assessment & Plan:  Blood pressure is currently running at goal.  Continue lisinopril/HCTZ 10/12.5 mg daily.  Refills sent.   Checking labs.    Orders:  -     Comprehensive Metabolic Panel; Future  -     lisinopril-hydrochlorothiazide (PRINZIDE,ZESTORETIC) 10-12.5 MG per tablet; Take 1 tablet by mouth Daily.  Dispense: 90 tablet; Refill: 1    3. Mixed hyperlipidemia  Assessment & Plan:  She has not tolerated statins in the past.  At her age, I do not see a lot of added utility to continuing to check a lipid panel when we know she is on interested in medication anyway.  We will discontinue lipid panels going forward from this point.      4. Acquired hypothyroidism  Assessment & Plan:  Stable on levothyroxine 88 mcg daily.  She is due for labs, which have been ordered today.  Refills were sent in the meantime.    Orders:  -     TSH; Future  -     levothyroxine (SYNTHROID, LEVOTHROID) 88 MCG tablet; Take 1 tablet by mouth Daily.  Dispense: 90 tablet; Refill: 1    5. Major depressive disorder, recurrent episode, moderate degree (HCC)  Assessment & Plan:  Quite stable on the venlafaxine to 25 mg daily with trazodone 50 to 100 mg nightly as needed.  Refill sent as below.  Continue to monitor.    Orders:  -     traZODone (DESYREL) 100 MG tablet; Take 0.5-1 tablets by mouth At Night As Needed for Sleep.  Dispense: 45 tablet; Refill: 1  -     venlafaxine XR (EFFEXOR-XR) 150 MG 24 hr capsule; Take 1 capsule by mouth Daily. Together with 75 mg daily to total 225 mg daily  Dispense: 90 capsule; Refill: 0  -     venlafaxine XR (EFFEXOR-XR) 75 MG 24 hr capsule; Take 1 capsule by mouth Daily. Take with 150 mg (225 mg total)  Dispense: 90 capsule; Refill: 1    6. Screening mammogram, encounter for  -     Mammo Screening Digital Tomosynthesis Bilateral With CAD; Future    7. Menopause  -     DEXA Bone Density Axial; Future      Follow Up:   Return in about 6 months (around 6/1/2023) for Recheck.     An After Visit Summary and PPPS were made available to the patient.

## 2022-12-01 NOTE — ASSESSMENT & PLAN NOTE
She has not tolerated statins in the past.  At her age, I do not see a lot of added utility to continuing to check a lipid panel when we know she is on interested in medication anyway.  We will discontinue lipid panels going forward from this point.

## 2022-12-01 NOTE — ASSESSMENT & PLAN NOTE
She is reportedly UTD on colon cancer screening; reports Dr. Coker recommended against colonoscopy when she saw him last year and instead ordered hemoccult screening.  Pap smear is no longer indicated by age and history.  She is due for mammogram, last done 8/2021 and this was normal; ordered.  She is due for DEXA, last done 9/2020 and this showed osteoporosis; ordered.  Currently on Fosamax.  She is UTD on Pneumovax (11/2016), Atiscgp71 (11/2015), Zostavax (8/2017).  She is due for COVID, Td.  COVID declines.  Tdap can be done at the pharmacy.  Thinks she already got the flu shot at [email or web address] in 9/2022; records requested.  She thinks she had Shingrix as well.  She is due for routine labs including CMP and TSH; ordered.   John Wood)  Pediatrics  [address] [place] [address]  Phone: [phone]  Fax: [phone]  Follow Up Time:

## 2022-12-06 ENCOUNTER — LAB (OUTPATIENT)
Dept: LAB | Facility: HOSPITAL | Age: 77
End: 2022-12-06

## 2022-12-06 DIAGNOSIS — E03.9 ACQUIRED HYPOTHYROIDISM: ICD-10-CM

## 2022-12-06 DIAGNOSIS — I10 PRIMARY HYPERTENSION: ICD-10-CM

## 2022-12-06 LAB — TSH SERPL DL<=0.05 MIU/L-ACNC: 0.64 UIU/ML (ref 0.27–4.2)

## 2022-12-06 PROCEDURE — 84443 ASSAY THYROID STIM HORMONE: CPT

## 2022-12-06 PROCEDURE — 80053 COMPREHEN METABOLIC PANEL: CPT

## 2022-12-06 PROCEDURE — 36415 COLL VENOUS BLD VENIPUNCTURE: CPT

## 2022-12-07 LAB
ALBUMIN SERPL-MCNC: 4.1 G/DL (ref 3.5–5.2)
ALBUMIN/GLOB SERPL: 1.5 G/DL
ALP SERPL-CCNC: 101 U/L (ref 39–117)
ALT SERPL W P-5'-P-CCNC: 11 U/L (ref 1–33)
ANION GAP SERPL CALCULATED.3IONS-SCNC: 10 MMOL/L (ref 5–15)
AST SERPL-CCNC: 20 U/L (ref 1–32)
BILIRUB SERPL-MCNC: 0.3 MG/DL (ref 0–1.2)
BUN SERPL-MCNC: 11 MG/DL (ref 8–23)
BUN/CREAT SERPL: 14.7 (ref 7–25)
CALCIUM SPEC-SCNC: 9.4 MG/DL (ref 8.6–10.5)
CHLORIDE SERPL-SCNC: 97 MMOL/L (ref 98–107)
CO2 SERPL-SCNC: 30 MMOL/L (ref 22–29)
CREAT SERPL-MCNC: 0.75 MG/DL (ref 0.57–1)
EGFRCR SERPLBLD CKD-EPI 2021: 81.1 ML/MIN/1.73
GLOBULIN UR ELPH-MCNC: 2.8 GM/DL
GLUCOSE SERPL-MCNC: 130 MG/DL (ref 65–99)
POTASSIUM SERPL-SCNC: 3.5 MMOL/L (ref 3.5–5.2)
PROT SERPL-MCNC: 6.9 G/DL (ref 6–8.5)
SODIUM SERPL-SCNC: 137 MMOL/L (ref 136–145)

## 2022-12-13 ENCOUNTER — HOSPITAL ENCOUNTER (OUTPATIENT)
Dept: BONE DENSITY | Facility: HOSPITAL | Age: 77
Discharge: HOME | End: 2022-12-13
Admitting: FAMILY MEDICINE

## 2022-12-13 DIAGNOSIS — Z78.0 MENOPAUSE: ICD-10-CM

## 2022-12-13 PROCEDURE — 77080 DXA BONE DENSITY AXIAL: CPT

## 2023-03-02 ENCOUNTER — HOSPITAL ENCOUNTER (OUTPATIENT)
Dept: MAMMOGRAPHY | Facility: HOSPITAL | Age: 78
Discharge: HOME | End: 2023-03-02
Admitting: FAMILY MEDICINE
Payer: MEDICARE

## 2023-03-02 DIAGNOSIS — Z12.31 SCREENING MAMMOGRAM, ENCOUNTER FOR: ICD-10-CM

## 2023-03-02 PROCEDURE — 77063 BREAST TOMOSYNTHESIS BI: CPT

## 2023-03-02 PROCEDURE — 77067 SCR MAMMO BI INCL CAD: CPT

## 2023-04-07 DIAGNOSIS — F33.1 MAJOR DEPRESSIVE DISORDER, RECURRENT EPISODE, MODERATE DEGREE: ICD-10-CM

## 2023-04-07 RX ORDER — TRAZODONE HYDROCHLORIDE 100 MG/1
TABLET ORAL
Qty: 45 TABLET | Refills: 1 | Status: SHIPPED | OUTPATIENT
Start: 2023-04-07

## 2023-04-07 RX ORDER — VENLAFAXINE HYDROCHLORIDE 150 MG/1
150 CAPSULE, EXTENDED RELEASE ORAL DAILY
Qty: 90 CAPSULE | Refills: 0 | Status: SHIPPED | OUTPATIENT
Start: 2023-04-07

## 2023-05-27 DIAGNOSIS — I10 PRIMARY HYPERTENSION: ICD-10-CM

## 2023-05-30 RX ORDER — LISINOPRIL AND HYDROCHLOROTHIAZIDE 12.5; 1 MG/1; MG/1
1 TABLET ORAL DAILY
Qty: 90 TABLET | Refills: 0 | Status: SHIPPED | OUTPATIENT
Start: 2023-05-30

## 2023-06-06 ENCOUNTER — TELEPHONE (OUTPATIENT)
Dept: FAMILY MEDICINE CLINIC | Facility: CLINIC | Age: 78
End: 2023-06-06

## 2023-06-06 PROBLEM — D22.9 CHANGE IN MOLE: Status: ACTIVE | Noted: 2023-06-06

## 2023-06-06 NOTE — TELEPHONE ENCOUNTER
Nutritional Supplements (Vitamin D Booster) THERAPY PACK isn't available. Pharmacy needs something different sent.

## 2024-01-08 DIAGNOSIS — F33.1 MAJOR DEPRESSIVE DISORDER, RECURRENT EPISODE, MODERATE DEGREE: ICD-10-CM

## 2024-01-08 RX ORDER — TRAZODONE HYDROCHLORIDE 100 MG/1
50-100 TABLET ORAL NIGHTLY PRN
Qty: 45 TABLET | Refills: 1 | Status: SHIPPED | OUTPATIENT
Start: 2024-01-08

## 2024-04-04 DIAGNOSIS — F33.1 MAJOR DEPRESSIVE DISORDER, RECURRENT EPISODE, MODERATE DEGREE: ICD-10-CM

## 2024-04-04 RX ORDER — TRAZODONE HYDROCHLORIDE 100 MG/1
50-100 TABLET ORAL NIGHTLY PRN
Qty: 45 TABLET | Refills: 1 | Status: SHIPPED | OUTPATIENT
Start: 2024-04-04

## 2024-05-20 DIAGNOSIS — M81.0 AGE-RELATED OSTEOPOROSIS WITHOUT CURRENT PATHOLOGICAL FRACTURE: ICD-10-CM

## 2024-05-20 RX ORDER — ALENDRONATE SODIUM 70 MG/1
TABLET ORAL
Qty: 12 TABLET | Refills: 3 | Status: SHIPPED | OUTPATIENT
Start: 2024-05-20

## 2024-06-05 DIAGNOSIS — E03.9 ACQUIRED HYPOTHYROIDISM: ICD-10-CM

## 2024-06-05 RX ORDER — LEVOTHYROXINE SODIUM 88 UG/1
88 TABLET ORAL DAILY
Qty: 30 TABLET | Refills: 0 | Status: SHIPPED | OUTPATIENT
Start: 2024-06-05

## 2024-06-12 ENCOUNTER — OFFICE VISIT (OUTPATIENT)
Dept: FAMILY MEDICINE CLINIC | Facility: CLINIC | Age: 79
End: 2024-06-12
Payer: MEDICARE

## 2024-06-12 ENCOUNTER — LAB (OUTPATIENT)
Dept: LAB | Facility: HOSPITAL | Age: 79
End: 2024-06-12
Payer: MEDICARE

## 2024-06-12 VITALS
DIASTOLIC BLOOD PRESSURE: 76 MMHG | TEMPERATURE: 97.8 F | SYSTOLIC BLOOD PRESSURE: 137 MMHG | BODY MASS INDEX: 25.21 KG/M2 | WEIGHT: 128.4 LBS | RESPIRATION RATE: 18 BRPM | HEIGHT: 60 IN | HEART RATE: 65 BPM | OXYGEN SATURATION: 99 %

## 2024-06-12 DIAGNOSIS — M81.0 AGE-RELATED OSTEOPOROSIS WITHOUT CURRENT PATHOLOGICAL FRACTURE: ICD-10-CM

## 2024-06-12 DIAGNOSIS — Z00.00 ENCOUNTER FOR SUBSEQUENT ANNUAL WELLNESS VISIT (AWV) IN MEDICARE PATIENT: ICD-10-CM

## 2024-06-12 DIAGNOSIS — E66.3 OVERWEIGHT (BMI 25.0-29.9): ICD-10-CM

## 2024-06-12 DIAGNOSIS — R41.3 MEMORY LOSS: ICD-10-CM

## 2024-06-12 DIAGNOSIS — G44.52 NEW DAILY PERSISTENT HEADACHE: ICD-10-CM

## 2024-06-12 DIAGNOSIS — E03.9 HYPOTHYROIDISM, UNSPECIFIED TYPE: ICD-10-CM

## 2024-06-12 DIAGNOSIS — F33.1 MAJOR DEPRESSIVE DISORDER, RECURRENT EPISODE, MODERATE DEGREE: ICD-10-CM

## 2024-06-12 DIAGNOSIS — Z00.00 ENCOUNTER FOR SUBSEQUENT ANNUAL WELLNESS VISIT (AWV) IN MEDICARE PATIENT: Primary | ICD-10-CM

## 2024-06-12 DIAGNOSIS — E03.9 ACQUIRED HYPOTHYROIDISM: ICD-10-CM

## 2024-06-12 DIAGNOSIS — I10 PRIMARY HYPERTENSION: ICD-10-CM

## 2024-06-12 LAB
CHOLEST SERPL-MCNC: 222 MG/DL (ref 0–200)
DEPRECATED RDW RBC AUTO: 40 FL (ref 37–54)
ERYTHROCYTE [DISTWIDTH] IN BLOOD BY AUTOMATED COUNT: 12.3 % (ref 12.3–15.4)
FOLATE SERPL-MCNC: 15.9 NG/ML (ref 4.78–24.2)
HCT VFR BLD AUTO: 41.9 % (ref 34–46.6)
HDLC SERPL-MCNC: 67 MG/DL (ref 40–60)
HGB BLD-MCNC: 14.3 G/DL (ref 12–15.9)
LDLC SERPL CALC-MCNC: 131 MG/DL (ref 0–100)
LDLC/HDLC SERPL: 1.91 {RATIO}
MCH RBC QN AUTO: 30.8 PG (ref 26.6–33)
MCHC RBC AUTO-ENTMCNC: 34.1 G/DL (ref 31.5–35.7)
MCV RBC AUTO: 90.3 FL (ref 79–97)
PLATELET # BLD AUTO: 384 10*3/MM3 (ref 140–450)
PMV BLD AUTO: 10 FL (ref 6–12)
RBC # BLD AUTO: 4.64 10*6/MM3 (ref 3.77–5.28)
T4 FREE SERPL-MCNC: 1.43 NG/DL (ref 0.92–1.68)
TRIGL SERPL-MCNC: 136 MG/DL (ref 0–150)
TSH SERPL DL<=0.05 MIU/L-ACNC: 0.52 UIU/ML (ref 0.27–4.2)
VIT B12 BLD-MCNC: 570 PG/ML (ref 211–946)
VLDLC SERPL-MCNC: 24 MG/DL (ref 5–40)
WBC NRBC COR # BLD AUTO: 10.23 10*3/MM3 (ref 3.4–10.8)

## 2024-06-12 PROCEDURE — 1160F RVW MEDS BY RX/DR IN RCRD: CPT | Performed by: FAMILY MEDICINE

## 2024-06-12 PROCEDURE — 84443 ASSAY THYROID STIM HORMONE: CPT

## 2024-06-12 PROCEDURE — 80053 COMPREHEN METABOLIC PANEL: CPT

## 2024-06-12 PROCEDURE — 80061 LIPID PANEL: CPT

## 2024-06-12 PROCEDURE — 36415 COLL VENOUS BLD VENIPUNCTURE: CPT

## 2024-06-12 PROCEDURE — 3075F SYST BP GE 130 - 139MM HG: CPT | Performed by: FAMILY MEDICINE

## 2024-06-12 PROCEDURE — 99214 OFFICE O/P EST MOD 30 MIN: CPT | Performed by: FAMILY MEDICINE

## 2024-06-12 PROCEDURE — 85027 COMPLETE CBC AUTOMATED: CPT

## 2024-06-12 PROCEDURE — 82746 ASSAY OF FOLIC ACID SERUM: CPT

## 2024-06-12 PROCEDURE — 84439 ASSAY OF FREE THYROXINE: CPT

## 2024-06-12 PROCEDURE — 1159F MED LIST DOCD IN RCRD: CPT | Performed by: FAMILY MEDICINE

## 2024-06-12 PROCEDURE — G0439 PPPS, SUBSEQ VISIT: HCPCS | Performed by: FAMILY MEDICINE

## 2024-06-12 PROCEDURE — 3078F DIAST BP <80 MM HG: CPT | Performed by: FAMILY MEDICINE

## 2024-06-12 PROCEDURE — 1126F AMNT PAIN NOTED NONE PRSNT: CPT | Performed by: FAMILY MEDICINE

## 2024-06-12 PROCEDURE — 82607 VITAMIN B-12: CPT

## 2024-06-12 RX ORDER — VENLAFAXINE HYDROCHLORIDE 150 MG/1
150 CAPSULE, EXTENDED RELEASE ORAL DAILY
Qty: 90 CAPSULE | Refills: 3 | Status: SHIPPED | OUTPATIENT
Start: 2024-06-12

## 2024-06-12 RX ORDER — ALENDRONATE SODIUM 70 MG/1
70 TABLET ORAL
Qty: 12 TABLET | Refills: 3 | Status: SHIPPED | OUTPATIENT
Start: 2024-06-12

## 2024-06-12 RX ORDER — LEVOTHYROXINE SODIUM 88 UG/1
88 TABLET ORAL DAILY
Qty: 90 TABLET | Refills: 3 | Status: SHIPPED | OUTPATIENT
Start: 2024-06-12 | End: 2024-06-17

## 2024-06-12 RX ORDER — TRAZODONE HYDROCHLORIDE 100 MG/1
50-100 TABLET ORAL NIGHTLY PRN
Qty: 45 TABLET | Refills: 3 | Status: SHIPPED | OUTPATIENT
Start: 2024-06-12

## 2024-06-12 RX ORDER — LISINOPRIL AND HYDROCHLOROTHIAZIDE 12.5; 1 MG/1; MG/1
1 TABLET ORAL DAILY
Qty: 90 TABLET | Refills: 3 | Status: SHIPPED | OUTPATIENT
Start: 2024-06-12

## 2024-06-12 RX ORDER — VENLAFAXINE HYDROCHLORIDE 75 MG/1
75 CAPSULE, EXTENDED RELEASE ORAL DAILY
Qty: 90 CAPSULE | Refills: 3 | Status: SHIPPED | OUTPATIENT
Start: 2024-06-12

## 2024-06-13 LAB
ALBUMIN SERPL-MCNC: 4.3 G/DL (ref 3.5–5.2)
ALBUMIN/GLOB SERPL: 1.3 G/DL
ALP SERPL-CCNC: 88 U/L (ref 39–117)
ALT SERPL W P-5'-P-CCNC: 11 U/L (ref 1–33)
ANION GAP SERPL CALCULATED.3IONS-SCNC: 10.6 MMOL/L (ref 5–15)
AST SERPL-CCNC: 14 U/L (ref 1–32)
BILIRUB SERPL-MCNC: 0.3 MG/DL (ref 0–1.2)
BUN SERPL-MCNC: 15 MG/DL (ref 8–23)
BUN/CREAT SERPL: 25 (ref 7–25)
CALCIUM SPEC-SCNC: 9.9 MG/DL (ref 8.6–10.5)
CHLORIDE SERPL-SCNC: 102 MMOL/L (ref 98–107)
CO2 SERPL-SCNC: 27.4 MMOL/L (ref 22–29)
CREAT SERPL-MCNC: 0.6 MG/DL (ref 0.57–1)
EGFRCR SERPLBLD CKD-EPI 2021: 90.9 ML/MIN/1.73
GLOBULIN UR ELPH-MCNC: 3.2 GM/DL
GLUCOSE SERPL-MCNC: 82 MG/DL (ref 65–99)
POTASSIUM SERPL-SCNC: 3.7 MMOL/L (ref 3.5–5.2)
PROT SERPL-MCNC: 7.5 G/DL (ref 6–8.5)
SODIUM SERPL-SCNC: 140 MMOL/L (ref 136–145)

## 2024-06-17 DIAGNOSIS — E03.9 ACQUIRED HYPOTHYROIDISM: ICD-10-CM

## 2024-06-17 RX ORDER — LEVOTHYROXINE SODIUM 88 UG/1
88 TABLET ORAL DAILY
Qty: 90 TABLET | Refills: 3 | Status: SHIPPED | OUTPATIENT
Start: 2024-06-17

## 2024-07-10 DIAGNOSIS — F33.1 MAJOR DEPRESSIVE DISORDER, RECURRENT EPISODE, MODERATE DEGREE: ICD-10-CM

## 2024-07-10 RX ORDER — VENLAFAXINE HYDROCHLORIDE 150 MG/1
150 CAPSULE, EXTENDED RELEASE ORAL DAILY
Qty: 90 CAPSULE | Refills: 3 | Status: SHIPPED | OUTPATIENT
Start: 2024-07-10

## 2024-07-17 ENCOUNTER — HOSPITAL ENCOUNTER (OUTPATIENT)
Dept: CT IMAGING | Facility: HOSPITAL | Age: 79
Discharge: HOME | End: 2024-07-17
Admitting: FAMILY MEDICINE
Payer: MEDICARE

## 2024-07-17 DIAGNOSIS — R41.3 MEMORY LOSS: ICD-10-CM

## 2024-07-17 DIAGNOSIS — G44.52 NEW DAILY PERSISTENT HEADACHE: ICD-10-CM

## 2024-07-17 PROCEDURE — 70450 CT HEAD/BRAIN W/O DYE: CPT

## 2024-07-23 ENCOUNTER — TELEPHONE (OUTPATIENT)
Dept: FAMILY MEDICINE CLINIC | Facility: CLINIC | Age: 79
End: 2024-07-23
Payer: COMMERCIAL

## 2024-07-23 DIAGNOSIS — M54.2 CERVICALGIA: Primary | ICD-10-CM

## 2024-07-23 RX ORDER — TIZANIDINE 4 MG/1
4 TABLET ORAL 2 TIMES DAILY PRN
Qty: 20 TABLET | Refills: 1 | Status: SHIPPED | OUTPATIENT
Start: 2024-07-23

## 2024-07-23 NOTE — TELEPHONE ENCOUNTER
Caller: Lena White    Relationship: Self    Best call back number: [phone]     What medication are you requesting: MUSCLE RELAXER     What are your current symptoms: BACK PAIN SHOOTING IN ARM/NECK/ PRESSURE IN HEAD     How long have you been experiencing symptoms: LAST WEEK     Have you had these symptoms before:    [] Yes  [] No    Have you been treated for these symptoms before:   [] Yes  [] No    If a prescription is needed, what is your preferred pharmacy and phone number: [place], [place] - [address] [place] [address] [phone] [facility] [phone]      Additional notes: SHE TURNED AROUND IN THE BED AND HER BACK POPPED. SHE STATED NO TO THE EMERGENCY ROOM. SHE WANTS A MILD MUSCLE RELAXER.

## 2024-10-01 ENCOUNTER — OFFICE VISIT (OUTPATIENT)
Dept: FAMILY MEDICINE CLINIC | Facility: CLINIC | Age: 79
End: 2024-10-01
Payer: MEDICARE

## 2024-10-01 VITALS
BODY MASS INDEX: 25.09 KG/M2 | OXYGEN SATURATION: 97 % | DIASTOLIC BLOOD PRESSURE: 73 MMHG | SYSTOLIC BLOOD PRESSURE: 125 MMHG | HEIGHT: 60 IN | WEIGHT: 127.8 LBS | TEMPERATURE: 97.8 F | HEART RATE: 68 BPM

## 2024-10-01 DIAGNOSIS — R05.9 COUGH, UNSPECIFIED TYPE: ICD-10-CM

## 2024-10-01 DIAGNOSIS — I10 PRIMARY HYPERTENSION: ICD-10-CM

## 2024-10-01 DIAGNOSIS — Z12.31 ENCOUNTER FOR SCREENING MAMMOGRAM FOR MALIGNANT NEOPLASM OF BREAST: ICD-10-CM

## 2024-10-01 DIAGNOSIS — F41.9 ANXIETY: ICD-10-CM

## 2024-10-01 DIAGNOSIS — F33.1 MAJOR DEPRESSIVE DISORDER, RECURRENT EPISODE, MODERATE DEGREE: Primary | ICD-10-CM

## 2024-10-01 DIAGNOSIS — R61 NIGHT SWEATS: ICD-10-CM

## 2024-10-01 PROCEDURE — 1159F MED LIST DOCD IN RCRD: CPT | Performed by: NURSE PRACTITIONER

## 2024-10-01 PROCEDURE — 1125F AMNT PAIN NOTED PAIN PRSNT: CPT | Performed by: NURSE PRACTITIONER

## 2024-10-01 PROCEDURE — 3074F SYST BP LT 130 MM HG: CPT | Performed by: NURSE PRACTITIONER

## 2024-10-01 PROCEDURE — 3078F DIAST BP <80 MM HG: CPT | Performed by: NURSE PRACTITIONER

## 2024-10-01 PROCEDURE — 1160F RVW MEDS BY RX/DR IN RCRD: CPT | Performed by: NURSE PRACTITIONER

## 2024-10-01 PROCEDURE — 99214 OFFICE O/P EST MOD 30 MIN: CPT | Performed by: NURSE PRACTITIONER

## 2024-10-01 NOTE — PROGRESS NOTES
Chief Complaint     Depression (Patient states she does not think her medication is working as well)    History of Present Illness     Lena White is a 78 y.o. female who presents to [facility] for evaluation of depression and anxiety.      Pt continues to take Effexor as prescribed. Also takes trazodone at night for insomnia. Denies any increased life stressors or situational changes.   States she think depression and anxiety is causing her to have tension in her neck. Went to chiropractor recently which helped. Pt also c/o night sweats on several occasions over the last month. States she will cough at night as well. Denies fever, chills, n/v/d/c, sore throat, rash or other. Denies any thoughts of suicide or self harm.      History      Past Medical History:   Diagnosis Date    Allergic     Anxiety 1984    Depression and anxiety meds since that date    Arthritis 1997    Removal of joint of big toe on right foot    Atypical ductal hyperplasia of left breast     Basal cell carcinoma     skin-face    Cataract 2014    Removed in 2014    Cholelithiasis 2014    Removal    Depression 1984    Dry eyes     Hearing loss     wears aids    History of transfusion     Hyperlipidemia     Hypertension     Hypothyroidism     Macular degeneration     Osteoporosis     Squamous cell skin cancer     face       Past Surgical History:   Procedure Laterality Date    BREAST BIOPSY Left 12/07/2021    Procedure: Left breast needle localized lumpectomy;  Surgeon: Cady Dunne MD;  Location: [facility] OR [facility];  Service: General;  Laterality: Left;    CHOLECYSTECTOMY      COLONOSCOPY      EXPLORATORY LAPAROTOMY      EYE SURGERY  2015    Cateract removed from both eyes    HYSTERECTOMY      INCISIONAL HERNIA REPAIR      x2    JOINT REPLACEMENT  2018    Replaced both knees    REPLACEMENT TOTAL KNEE Bilateral 07/2018    SKIN CANCER EXCISION      SPLENECTOMY      TONSILLECTOMY      VENTRAL HERNIA REPAIR   2016       Family History   Problem Relation Age of Onset    Sarcoidosis Mother     Arthritis Mother     Cancer Mother     Depression Mother          of Sarcoidosis    Stroke Mother     Lung cancer Father     Arthritis Father     Cancer Father         Current Medications        Current Outpatient Medications:     alendronate (FOSAMAX) 70 MG tablet, Take 1 tablet by mouth Every 7 (Seven) Days., Disp: 12 tablet, Rfl: 3    levothyroxine (SYNTHROID, LEVOTHROID) 88 MCG tablet, TAKE 1 TABLET BY MOUTH DAILY, Disp: 90 tablet, Rfl: 3    lisinopril-hydrochlorothiazide (PRINZIDE,ZESTORETIC) 10-12.5 MG per tablet, Take 1 tablet by mouth Daily., Disp: 90 tablet, Rfl: 3    multivitamin with minerals tablet tablet, Take 1 tablet by mouth Daily. Eye vitamin, Disp: 90 tablet, Rfl: 3    Omega-3 Fatty Acids (Fish Oil Concentrate) 1000 MG capsule, Take 1 capsule by mouth Daily., Disp: 90 each, Rfl: 3    tiZANidine (ZANAFLEX) 4 MG tablet, Take 1 tablet by mouth 2 (Two) Times a Day As Needed for Muscle Spasms., Disp: 20 tablet, Rfl: 1    traZODone (DESYREL) 100 MG tablet, Take 0.5-1 tablets by mouth At Night As Needed for Sleep., Disp: 45 tablet, Rfl: 3    venlafaxine XR (EFFEXOR-XR) 150 MG 24 hr capsule, TAKE 1 CAPSULE BY MOUTH DAILY. TOGETHER WITH 75 MG DAILY TO TOTAL 225 MG DAILY, Disp: 90 capsule, Rfl: 3    venlafaxine XR (EFFEXOR-XR) 75 MG 24 hr capsule, Take 1 capsule by mouth Daily. Take with 150 mg (225 mg total), Disp: 90 capsule, Rfl: 3    Cariprazine HCl (Vraylar) 1.5 MG capsule capsule, Take 1 capsule by mouth Daily for 30 days., Disp: 30 capsule, Rfl: 0     Allergies     Allergies   Allergen Reactions    Hyaluronic Acid-Hydroquinone Swelling     Rooster cone injection for knee    Methylprednisolone Irritability    Zolpidem Tartrate Irritability       Social History       Social History     Social History Narrative    Not on file       Immunizations     Immunization:  Immunization History   Administered Date(s)  "Administered    FLUAD TRI 65YR+ 10/04/2017    Fluad Quad 65+ 10/15/2020    Fluzone (or Fluarix & Flulaval for VFC) >6mos 12/05/2022    Fluzone High-Dose 65+yrs 11/18/2021    Zostavax 08/03/2017          Objective     Objective     Vital Signs:   /73 (BP Location: Left arm, Patient Position: Sitting, Cuff Size: Adult)   Pulse 68   Temp 97.8 °F (36.6 °C) (Temporal)   Ht 152.4 cm (60\")   Wt 58 kg (127 lb 12.8 oz)   SpO2 97%   BMI 24.96 kg/m²       Physical Exam  Vitals reviewed.   Constitutional:       General: She is not in acute distress.     Appearance: Normal appearance.   HENT:      Head: Normocephalic.      Right Ear: Tympanic membrane normal.      Left Ear: Tympanic membrane normal.      Nose: Nose normal.      Mouth/Throat:      Pharynx: Oropharynx is clear. No posterior oropharyngeal erythema.   Eyes:      General: No scleral icterus.     Extraocular Movements: Extraocular movements intact.      Conjunctiva/sclera: Conjunctivae normal.      Pupils: Pupils are equal, round, and reactive to light.   Cardiovascular:      Rate and Rhythm: Normal rate and regular rhythm.      Pulses: Normal pulses.      Heart sounds: Normal heart sounds.   Pulmonary:      Effort: Pulmonary effort is normal.      Breath sounds: Normal breath sounds.   Abdominal:      General: Bowel sounds are normal.      Palpations: Abdomen is soft.   Musculoskeletal:         General: Normal range of motion.      Cervical back: Neck supple.   Skin:     General: Skin is warm and dry.   Neurological:      Mental Status: She is alert and oriented to person, place, and time.   Psychiatric:         Mood and Affect: Mood normal.         Behavior: Behavior normal.         Thought Content: Thought content normal.         Judgment: Judgment normal.         Results      Result Review :     Common labs          6/12/2024    16:13   Common Labs   Glucose 82    BUN 15    Creatinine 0.60    Sodium 140    Potassium 3.7    Chloride 102    Calcium 9.9  "   Albumin 4.3    Total Bilirubin 0.3    Alkaline Phosphatase 88    AST (SGOT) 14    ALT (SGPT) 11    WBC 10.23    Hemoglobin 14.3    Hematocrit 41.9    Platelets 384    Total Cholesterol 222    Triglycerides 136    HDL Cholesterol 67    LDL Cholesterol  131      Mammo Screening Digital Tomosynthesis Bilateral With CAD (03/02/2023 16:16)        Assessment and Plan        Assessment and Plan    Diagnoses and all orders for this visit:    1. Major depressive disorder, recurrent episode, moderate degree (Primary)  Assessment & Plan:  Worsening   Start Vraylar 1.5mg PO qd   Discussed all med SE/AEs including all BB warnings, if these occur, pt will DC medication immediately, notify office, and proceed to ED.   FU with PCP in 2 weeks for possible medication increase      2. Anxiety  Assessment & Plan:  Worsening   Start Vraylar 1.5mg PO qd   Discussed all med SE/AEs including all BB warnings, if these occur, pt will DC medication immediately, notify office, and proceed to ED.   FU with PCP in 2 weeks for possible medication increase      3. Primary hypertension  Assessment & Plan:  Hypertension is improving with treatment.  Continue current treatment regimen.  Dietary sodium restriction.  Weight loss.  Regular aerobic exercise.  Blood pressure will be reassessed at the next regular appointment.        4. Cough, unspecified type  -     XR Chest PA & Lateral; Future    5. Night sweats  Comments:  chest XR ordered  Orders:  -     XR Chest PA & Lateral; Future    6. Encounter for screening mammogram for malignant neoplasm of breast  -     Mammo Screening Digital Tomosynthesis Bilateral With CAD; Future    Other orders  -     Cariprazine HCl (Vraylar) 1.5 MG capsule capsule; Take 1 capsule by mouth Daily for 30 days.  Dispense: 30 capsule; Refill: 0              Follow Up        Follow Up   Return in about 2 weeks (around 10/15/2024), or if symptoms worsen or fail to improve, for Dr. Hussein.  Patient was given instructions and  counseling regarding her condition or for health maintenance advice. Please see specific information pulled into the AVS if appropriate.

## 2024-10-01 NOTE — ASSESSMENT & PLAN NOTE
Worsening   Start Vraylar 1.5mg PO qd   Discussed all med SE/AEs including all BB warnings, if these occur, pt will DC medication immediately, notify office, and proceed to ED.   FU with PCP in 2 weeks for possible medication increase

## 2024-10-03 ENCOUNTER — HOSPITAL ENCOUNTER (OUTPATIENT)
Dept: GENERAL RADIOLOGY | Facility: HOSPITAL | Age: 79
Discharge: HOME | End: 2024-10-03
Admitting: NURSE PRACTITIONER
Payer: MEDICARE

## 2024-10-03 DIAGNOSIS — R05.9 COUGH, UNSPECIFIED TYPE: ICD-10-CM

## 2024-10-03 DIAGNOSIS — R61 NIGHT SWEATS: ICD-10-CM

## 2024-10-03 PROCEDURE — 71046 X-RAY EXAM CHEST 2 VIEWS: CPT

## 2024-10-04 RX ORDER — BUPROPION HYDROCHLORIDE 150 MG/1
150 TABLET ORAL DAILY
Qty: 30 TABLET | Refills: 3 | Status: SHIPPED | OUTPATIENT
Start: 2024-10-04

## 2024-10-17 ENCOUNTER — OFFICE VISIT (OUTPATIENT)
Dept: FAMILY MEDICINE CLINIC | Facility: CLINIC | Age: 79
End: 2024-10-17
Payer: MEDICARE

## 2024-10-17 VITALS
OXYGEN SATURATION: 95 % | DIASTOLIC BLOOD PRESSURE: 74 MMHG | HEART RATE: 70 BPM | RESPIRATION RATE: 16 BRPM | WEIGHT: 133 LBS | BODY MASS INDEX: 26.11 KG/M2 | TEMPERATURE: 98.3 F | HEIGHT: 60 IN | SYSTOLIC BLOOD PRESSURE: 111 MMHG

## 2024-10-17 DIAGNOSIS — E03.9 ACQUIRED HYPOTHYROIDISM: Chronic | ICD-10-CM

## 2024-10-17 DIAGNOSIS — M81.0 AGE-RELATED OSTEOPOROSIS WITHOUT CURRENT PATHOLOGICAL FRACTURE: Chronic | ICD-10-CM

## 2024-10-17 DIAGNOSIS — F33.1 MAJOR DEPRESSIVE DISORDER, RECURRENT EPISODE, MODERATE DEGREE: Primary | Chronic | ICD-10-CM

## 2024-10-17 DIAGNOSIS — I10 PRIMARY HYPERTENSION: Chronic | ICD-10-CM

## 2024-10-17 DIAGNOSIS — E61.1 IRON DEFICIENCY: ICD-10-CM

## 2024-10-17 DIAGNOSIS — F41.9 ANXIETY: Chronic | ICD-10-CM

## 2024-10-17 PROCEDURE — 3078F DIAST BP <80 MM HG: CPT | Performed by: FAMILY MEDICINE

## 2024-10-17 PROCEDURE — 1125F AMNT PAIN NOTED PAIN PRSNT: CPT | Performed by: FAMILY MEDICINE

## 2024-10-17 PROCEDURE — 99214 OFFICE O/P EST MOD 30 MIN: CPT | Performed by: FAMILY MEDICINE

## 2024-10-17 PROCEDURE — 3074F SYST BP LT 130 MM HG: CPT | Performed by: FAMILY MEDICINE

## 2024-10-21 NOTE — PROGRESS NOTES
"Chief Complaint  Anxiety (Follow up on medication. )    Subjective          Lena White presents to [facility]  Anxiety        Patient presents to follow-up on anxiety recently had medication added, on Effexor chronically Wellbutrin was recently started on 10//2024 seems to be helping tremendously no SI HI or other or increased panic anxiety symptoms    Patient blood pressure less than 140/90 on medicine as prescribed takes regularly without complication also on trazodone to help with insomnia issues sleep    Taking Synthroid 88 mcg for hypothyroidism Fosamax for osteoporosis     Had labs checked 6/2024 TSH T4 within normal limits at that time, DEXA scan on 12/2022 showed osteoporosis consider repeating later this year    Objective   Vital Signs:   /74 (BP Location: Right arm, Patient Position: Sitting, Cuff Size: Adult)   Pulse 70   Temp 98.3 °F (36.8 °C)   Resp 16   Ht 152.4 cm (60\")   Wt 60.3 kg (133 lb)   SpO2 95%   BMI 25.97 kg/m²            Physical Exam  Vitals reviewed.   Constitutional:       Appearance: Normal appearance. She is well-developed.   HENT:      Head: Normocephalic and atraumatic.      Right Ear: External ear normal.      Left Ear: External ear normal.      Nose: Nose normal.   Eyes:      Conjunctiva/sclera: Conjunctivae normal.      Pupils: Pupils are equal, round, and reactive to light.   Cardiovascular:      Rate and Rhythm: Normal rate.   Pulmonary:      Effort: Pulmonary effort is normal.      Breath sounds: Normal breath sounds.   Abdominal:      General: There is no distension.   Skin:     General: Skin is warm and dry.   Neurological:      Mental Status: She is alert and oriented to person, place, and time. Mental status is at baseline.   Psychiatric:         Mood and Affect: Mood and affect normal.         Behavior: Behavior normal.         Thought Content: Thought content normal.         Judgment: Judgment normal.          Result Review " :   The following data was reviewed by: Quinn Hussein DO on 10/17/2024:  Common labs          6/12/2024    16:13   Common Labs   Glucose 82    BUN 15    Creatinine 0.60    Sodium 140    Potassium 3.7    Chloride 102    Calcium 9.9    Albumin 4.3    Total Bilirubin 0.3    Alkaline Phosphatase 88    AST (SGOT) 14    ALT (SGPT) 11    WBC 10.23    Hemoglobin 14.3    Hematocrit 41.9    Platelets 384    Total Cholesterol 222    Triglycerides 136    HDL Cholesterol 67    LDL Cholesterol  131      Data reviewed : Radiologic studies DEXA 12/2022 osteoporosis noted, consider repeat to review if fosamax improving 12/2024 or later               Assessment and Plan    Diagnoses and all orders for this visit:    1. Major depressive disorder, recurrent episode, moderate degree (Primary)    2. Primary hypertension  -     TSH+Free T4; Future  -     Vitamin B12; Future  -     Folate; Future  -     Iron Profile; Future  -     Ferritin; Future    3. Acquired hypothyroidism  -     TSH+Free T4; Future  -     Vitamin B12; Future  -     Folate; Future  -     Iron Profile; Future  -     Ferritin; Future    4. Iron deficiency  -     Iron Profile; Future  -     Ferritin; Future    5. Anxiety    6. Age-related osteoporosis without current pathological fracture      Continue medicines as prescribed for anxiety depression, taking Effexor 325 mg total daily recently started on Wellbutrin 150 mg daily improving anxiety reviewed today    Continue trazodone for insomnia and sleep taking half to 1 tablet nightly, 5200 mg as needed    Blood pressure goal less than 140/90 continue medicine as prescribed and monitoring at least every 6 months    Continue Synthroid as prescribed recheck TSH T4 every 6 months last labs reviewed 6/2024 within normal limits    Continue Fosamax consider rechecking DEXA scan later in the year last was 12/2022        Follow Up   Return in about 3 months (around 1/17/2025), or if symptoms worsen or fail to improve, for Next  scheduled follow up, Recheck.  Patient was given instructions and counseling regarding her condition or for health maintenance advice. Please see specific information pulled into the AVS if appropriate.     Transcribed from ambient dictation for Quinn Hussein DO by Quinn Hussein DO.  10/20/24   22:15 EDT

## 2024-12-11 ENCOUNTER — LAB (OUTPATIENT)
Dept: LAB | Facility: HOSPITAL | Age: 79
End: 2024-12-11
Payer: MEDICARE

## 2024-12-11 DIAGNOSIS — I10 PRIMARY HYPERTENSION: Chronic | ICD-10-CM

## 2024-12-11 DIAGNOSIS — E03.9 ACQUIRED HYPOTHYROIDISM: Chronic | ICD-10-CM

## 2024-12-11 DIAGNOSIS — E61.1 IRON DEFICIENCY: ICD-10-CM

## 2024-12-11 LAB
FERRITIN SERPL-MCNC: 105 NG/ML (ref 13–150)
FOLATE SERPL-MCNC: 17.6 NG/ML (ref 4.78–24.2)
IRON 24H UR-MRATE: 69 MCG/DL (ref 37–145)
IRON SATN MFR SERPL: 20 % (ref 20–50)
T4 FREE SERPL-MCNC: 1.31 NG/DL (ref 0.92–1.68)
TIBC SERPL-MCNC: 341 MCG/DL (ref 298–536)
TRANSFERRIN SERPL-MCNC: 229 MG/DL (ref 200–360)
TSH SERPL DL<=0.05 MIU/L-ACNC: 4.23 UIU/ML (ref 0.27–4.2)
VIT B12 BLD-MCNC: 578 PG/ML (ref 211–946)

## 2024-12-11 PROCEDURE — 83540 ASSAY OF IRON: CPT

## 2024-12-11 PROCEDURE — 82607 VITAMIN B-12: CPT

## 2024-12-11 PROCEDURE — 82728 ASSAY OF FERRITIN: CPT

## 2024-12-11 PROCEDURE — 82746 ASSAY OF FOLIC ACID SERUM: CPT

## 2024-12-11 PROCEDURE — 84466 ASSAY OF TRANSFERRIN: CPT

## 2024-12-11 PROCEDURE — 84439 ASSAY OF FREE THYROXINE: CPT

## 2024-12-11 PROCEDURE — 84443 ASSAY THYROID STIM HORMONE: CPT

## 2024-12-11 PROCEDURE — 36415 COLL VENOUS BLD VENIPUNCTURE: CPT

## 2025-01-31 RX ORDER — BUPROPION HYDROCHLORIDE 150 MG/1
150 TABLET ORAL DAILY
Qty: 30 TABLET | Refills: 2 | Status: SHIPPED | OUTPATIENT
Start: 2025-01-31

## 2025-02-14 ENCOUNTER — HOSPITAL ENCOUNTER (OUTPATIENT)
Dept: MAMMOGRAPHY | Facility: HOSPITAL | Age: 80
Discharge: HOME | End: 2025-02-14
Admitting: NURSE PRACTITIONER
Payer: MEDICARE

## 2025-02-14 DIAGNOSIS — Z12.31 ENCOUNTER FOR SCREENING MAMMOGRAM FOR MALIGNANT NEOPLASM OF BREAST: ICD-10-CM

## 2025-02-14 PROCEDURE — 77067 SCR MAMMO BI INCL CAD: CPT

## 2025-02-14 PROCEDURE — 77063 BREAST TOMOSYNTHESIS BI: CPT

## 2025-03-01 ENCOUNTER — HOSPITAL ENCOUNTER (EMERGENCY)
Facility: HOSPITAL | Age: 80
Discharge: HOME | End: 2025-03-01
Attending: EMERGENCY MEDICINE
Payer: MEDICARE

## 2025-03-01 ENCOUNTER — APPOINTMENT (OUTPATIENT)
Dept: GENERAL RADIOLOGY | Facility: HOSPITAL | Age: 80
End: 2025-03-01
Payer: MEDICARE

## 2025-03-01 VITALS
HEART RATE: 76 BPM | TEMPERATURE: 98.2 F | DIASTOLIC BLOOD PRESSURE: 73 MMHG | BODY MASS INDEX: 26.1 KG/M2 | RESPIRATION RATE: 16 BRPM | OXYGEN SATURATION: 96 % | SYSTOLIC BLOOD PRESSURE: 121 MMHG | HEIGHT: 60 IN | WEIGHT: 132.94 LBS

## 2025-03-01 DIAGNOSIS — K04.7 DENTAL INFECTION: ICD-10-CM

## 2025-03-01 DIAGNOSIS — M54.41 ACUTE BACK PAIN WITH SCIATICA, RIGHT: Primary | ICD-10-CM

## 2025-03-01 PROCEDURE — 25010000002 DEXAMETHASONE SODIUM PHOSPHATE 10 MG/ML SOLUTION: Performed by: NURSE PRACTITIONER

## 2025-03-01 PROCEDURE — 73502 X-RAY EXAM HIP UNI 2-3 VIEWS: CPT

## 2025-03-01 PROCEDURE — 99283 EMERGENCY DEPT VISIT LOW MDM: CPT

## 2025-03-01 PROCEDURE — 25010000002 KETOROLAC TROMETHAMINE PER 15 MG: Performed by: NURSE PRACTITIONER

## 2025-03-01 PROCEDURE — 96372 THER/PROPH/DIAG INJ SC/IM: CPT

## 2025-03-01 RX ORDER — PENICILLIN V POTASSIUM 500 MG/1
500 TABLET, FILM COATED ORAL 4 TIMES DAILY
Qty: 28 TABLET | Refills: 0 | Status: SHIPPED | OUTPATIENT
Start: 2025-03-01 | End: 2025-03-08

## 2025-03-01 RX ORDER — DEXAMETHASONE SODIUM PHOSPHATE 10 MG/ML
6 INJECTION, SOLUTION INTRAMUSCULAR; INTRAVENOUS ONCE
Status: COMPLETED | OUTPATIENT
Start: 2025-03-01 | End: 2025-03-01

## 2025-03-01 RX ORDER — KETOROLAC TROMETHAMINE 30 MG/ML
30 INJECTION, SOLUTION INTRAMUSCULAR; INTRAVENOUS ONCE
Status: COMPLETED | OUTPATIENT
Start: 2025-03-01 | End: 2025-03-01

## 2025-03-01 RX ADMIN — DEXAMETHASONE SODIUM PHOSPHATE 6 MG: 10 INJECTION INTRAMUSCULAR; INTRAVENOUS at 11:21

## 2025-03-01 RX ADMIN — KETOROLAC TROMETHAMINE 30 MG: 30 INJECTION, SOLUTION INTRAMUSCULAR; INTRAVENOUS at 11:21

## 2025-03-01 NOTE — DISCHARGE INSTRUCTIONS
Rest, apply ice as discussed. Follow up with your PCP in 3 days if no better.  Take the full course of Penicillin as prescribed and keep the appt with your dentis next week.

## 2025-03-07 RX ORDER — DICLOFENAC SODIUM 75 MG/1
75 TABLET, DELAYED RELEASE ORAL 2 TIMES DAILY
Qty: 60 TABLET | Refills: 0 | Status: SHIPPED | OUTPATIENT
Start: 2025-03-07 | End: 2025-03-10

## 2025-03-07 NOTE — TELEPHONE ENCOUNTER
Caller: Lena White    Relationship to patient: Self    Best call back number: [phone]    Patient is needing: PATIENT CALLED IN AND IS HAVING PAIN IN LEG AND KNEE AND HAS BEEN HAVING PAIN FOR ABOUT A WEEK. PATIENT HAS AN UPCOMING APPOINTMENT WITH DR. OHARA, BUT IS REQUESTING A CALL BACK WITH GUIDANCE AND A POSSIBLE PRESCRIPTION FOR PAIN MEDICATION. PATIENT SAID SHE CAN'T TAKE IBUPROFEN      [address] - [phone]  - [phone]  [phone]

## 2025-03-07 NOTE — TELEPHONE ENCOUNTER
Patients son asking if you can prescribe some diclofenac for SI joint pain.  He states she cannot take ibuprofen.  I advised to try tylenol, he was wanting more of an anti inflammatory medication for her.  Pended diclofenac if ok.  Has kim Jang 3/10/25.

## 2025-03-10 ENCOUNTER — OFFICE VISIT (OUTPATIENT)
Dept: FAMILY MEDICINE CLINIC | Facility: CLINIC | Age: 80
End: 2025-03-10
Payer: MEDICARE

## 2025-03-10 VITALS
TEMPERATURE: 98.4 F | SYSTOLIC BLOOD PRESSURE: 104 MMHG | DIASTOLIC BLOOD PRESSURE: 78 MMHG | WEIGHT: 131 LBS | HEART RATE: 78 BPM | OXYGEN SATURATION: 97 % | BODY MASS INDEX: 25.72 KG/M2 | HEIGHT: 60 IN

## 2025-03-10 DIAGNOSIS — M79.604 RIGHT LEG PAIN: Primary | ICD-10-CM

## 2025-03-10 DIAGNOSIS — M25.561 ACUTE PAIN OF RIGHT KNEE: ICD-10-CM

## 2025-03-10 PROCEDURE — 3078F DIAST BP <80 MM HG: CPT

## 2025-03-10 PROCEDURE — 3074F SYST BP LT 130 MM HG: CPT

## 2025-03-10 PROCEDURE — 1125F AMNT PAIN NOTED PAIN PRSNT: CPT

## 2025-03-10 PROCEDURE — 99214 OFFICE O/P EST MOD 30 MIN: CPT

## 2025-03-10 NOTE — PROGRESS NOTES
Chief Complaint     Leg Pain (Right for 2 wks, ER says sciatica ) and Hip Pain    History of Present Illness     Lena White is a 79 y.o. female who presents to [facility] for evaluation of leg pain on the right side.        She states it started at her lower back and goes into her hip and all the way does to her knee. Her knee has a small lump around the inferior patella that she believes might be a screw coming loose from her knee surgery she had. She has an appointment with Ortho on Wednesday. I had sent her in diclofenac on Friday but she did not know and did not pick that up yet. She was offered tramadol and she would like to try the diclofenac first and will call tomorrow morning if she wants the tramadol.      History      Past Medical History:   Diagnosis Date    Allergic     Anxiety 1984    Depression and anxiety meds since that date    Arthritis 1997    Removal of joint of big toe on right foot    Atypical ductal hyperplasia of left breast     Basal cell carcinoma     skin-face    Cataract 2014    Removed in 2014    Cholelithiasis 2014    Removal    Depression 1984    Dry eyes     Hearing loss     wears aids    History of transfusion     Hyperlipidemia     Hypertension     Hypothyroidism     Macular degeneration     Osteoporosis     Squamous cell skin cancer     face       Past Surgical History:   Procedure Laterality Date    BREAST BIOPSY Left 12/07/2021    Procedure: Left breast needle localized lumpectomy;  Surgeon: Cady Dunne MD;  Location: [facility];  Service: General;  Laterality: Left;    CHOLECYSTECTOMY      COLONOSCOPY      EXPLORATORY LAPAROTOMY      EYE SURGERY  2015    Cateract removed from both eyes    HYSTERECTOMY      INCISIONAL HERNIA REPAIR      x2    JOINT REPLACEMENT  2018    Replaced both knees    REPLACEMENT TOTAL KNEE Bilateral 07/2018    SKIN CANCER EXCISION      SPLENECTOMY      TONSILLECTOMY      VENTRAL HERNIA REPAIR  11/2016        Family History   Problem Relation Age of Onset    Sarcoidosis Mother     Arthritis Mother     Cancer Mother     Depression Mother          of Sarcoidosis    Stroke Mother     Lung cancer Father     Arthritis Father     Cancer Father         Current Medications        Current Outpatient Medications:     buPROPion XL (WELLBUTRIN XL) 150 MG 24 hr tablet, TAKE 1 TABLET BY MOUTH ONCE DAILY, Disp: 30 tablet, Rfl: 2    levothyroxine (SYNTHROID, LEVOTHROID) 88 MCG tablet, TAKE 1 TABLET BY MOUTH DAILY, Disp: 90 tablet, Rfl: 3    lisinopril-hydrochlorothiazide (PRINZIDE,ZESTORETIC) 10-12.5 MG per tablet, Take 1 tablet by mouth Daily., Disp: 90 tablet, Rfl: 3    multivitamin with minerals tablet tablet, Take 1 tablet by mouth Daily. Eye vitamin, Disp: 90 tablet, Rfl: 3    Omega-3 Fatty Acids (Fish Oil Concentrate) 1000 MG capsule, Take 1 capsule by mouth Daily., Disp: 90 each, Rfl: 3    traZODone (DESYREL) 100 MG tablet, Take 0.5-1 tablets by mouth At Night As Needed for Sleep., Disp: 45 tablet, Rfl: 3    venlafaxine XR (EFFEXOR-XR) 150 MG 24 hr capsule, TAKE 1 CAPSULE BY MOUTH DAILY. TOGETHER WITH 75 MG DAILY TO TOTAL 225 MG DAILY, Disp: 90 capsule, Rfl: 3    venlafaxine XR (EFFEXOR-XR) 75 MG 24 hr capsule, Take 1 capsule by mouth Daily. Take with 150 mg (225 mg total), Disp: 90 capsule, Rfl: 3     Allergies     Allergies   Allergen Reactions    Hyaluronic Acid-Hydroquinone Swelling     Rooster cone injection for knee    Methylprednisolone Irritability    Zolpidem Tartrate Irritability       Social History       Social History     Social History Narrative    Not on file       Immunizations     Immunization:  Immunization History   Administered Date(s) Administered    FLUAD TRI 65YR+ 10/04/2017    Fluad Quad 65+ 10/15/2020    Fluzone (or Fluarix & Flulaval for VFC) >6mos 2022    Fluzone High-Dose 65+yrs 2021    Zostavax 2017          Objective     Objective     Vital Signs:   /78 (BP Location:  "Left arm, Patient Position: Sitting, Cuff Size: Small Adult)   Pulse 78   Temp 98.4 °F (36.9 °C) (Temporal)   Ht 152.4 cm (60\")   Wt 59.4 kg (131 lb)   SpO2 97%   BMI 25.58 kg/m²       Physical Exam  Constitutional:       Appearance: Normal appearance.   HENT:      Nose: Nose normal.      Mouth/Throat:      Mouth: Mucous membranes are moist.   Cardiovascular:      Rate and Rhythm: Normal rate and regular rhythm.      Heart sounds: Normal heart sounds.   Pulmonary:      Effort: Pulmonary effort is normal.      Breath sounds: Normal breath sounds.   Musculoskeletal:         General: Tenderness (right knee) present.   Skin:     General: Skin is warm and dry.   Neurological:      General: No focal deficit present.      Mental Status: She is alert and oriented to person, place, and time.   Psychiatric:         Mood and Affect: Mood normal.         Behavior: Behavior normal.         Results    The following data was reviewed by: GABO Max on 03/10/2025:             Assessment and Plan        Assessment and Plan    Diagnoses and all orders for this visit:    1. Right leg pain (Primary)  Comments:  She will  the diclofenac to try today. Call back tomorrow if not improved.    2. Acute pain of right knee  Comments:  Keep appt with ortho.  diclofenac and take as prescribed.            I spent 30 minutes caring for Lena on this date of service. This time includes time spent by me in the following activities:preparing for the visit, reviewing tests, performing a medically appropriate examination and/or evaluation , counseling and educating the patient/family/caregiver, ordering medications, tests, or procedures, and documenting information in the medical record  Follow Up        Follow Up   Return if symptoms worsen or fail to improve.  Patient was given instructions and counseling regarding her condition or for health maintenance advice. Please see specific information pulled into the AVS if " appropriate.

## 2025-03-12 ENCOUNTER — OFFICE VISIT (OUTPATIENT)
Dept: ORTHOPEDIC SURGERY | Facility: CLINIC | Age: 80
End: 2025-03-12
Payer: MEDICARE

## 2025-03-12 VITALS
HEIGHT: 60 IN | HEART RATE: 84 BPM | OXYGEN SATURATION: 92 % | SYSTOLIC BLOOD PRESSURE: 120 MMHG | DIASTOLIC BLOOD PRESSURE: 74 MMHG | WEIGHT: 131 LBS | BODY MASS INDEX: 25.72 KG/M2

## 2025-03-12 DIAGNOSIS — Z96.651 HISTORY OF ARTHROPLASTY OF RIGHT KNEE: ICD-10-CM

## 2025-03-12 DIAGNOSIS — M25.561 RIGHT KNEE PAIN, UNSPECIFIED CHRONICITY: Primary | ICD-10-CM

## 2025-03-12 NOTE — PROGRESS NOTES
"Chief Complaint  Initial Evaluation of the Right Knee       Subjective      Lena White presents to [facility] for an evaluation  of her right knee. She has had a right knee arthroplasty back in 2018. She states she is overall doing well regarding her right knee. She states her pain starts to her low lumbar and radiates down to her foot. She denies any specific injury, trauma or falls. She presents today in a wheel chair. She states she has muscle spasms to her low lumbar.     Allergies   Allergen Reactions    Hyaluronic Acid-Hydroquinone Swelling     Rooster cone injection for knee    Methylprednisolone Irritability    Zolpidem Tartrate Irritability        Social History     Socioeconomic History    Marital status:    Tobacco Use    Smoking status: Never     Passive exposure: Never    Smokeless tobacco: Never   Vaping Use    Vaping status: Never Used   Substance and Sexual Activity    Alcohol use: Never    Drug use: Never    Sexual activity: Not Currently     Partners: Male     Birth control/protection: None        I reviewed the patient's chief complaint, history of present illness, review of systems, past medical history, surgical history, family history, social history, medications, and allergy list.     Review of Systems     Constitutional: Denies fevers, chills, weight loss  Cardiovascular: Denies chest pain, shortness of breath  Skin: Denies rashes, acute skin changes  Neurologic: Denies headache, loss of consciousness  MSK: right knee pain       Vital Signs:   /74   Pulse 84   Ht 152.4 cm (60\")   Wt 59.4 kg (131 lb)   SpO2 92%   BMI 25.58 kg/m²          Physical Exam  General: Alert. No acute distress    Ortho Exam        Right lower extremity: well healed surgicial incision, full extension, flexion  to 120 degrees, stable to varus/valgus stress, stable to anterior/posterior drawer, no swelling or effusion, non tender to the medial joint line  and lateral " joint line, calf soft, distal neurovascularly intact , positive  pulses. ta      Procedures        Imaging Results (Most Recent)       Procedure Component Value Units Date/Time    XR Knee 3 View Right [[id number]] Resulted: 03/12/25 1504     Updated: 03/12/25 1504             Result Review :     X-Ray Report:  Right knee X-Ray  Indication: Evaluation of right knee pain   AP/Lateral and McCalla view(s)  Findings: intact right knee replacement. no signs of loosening, subsidence or periprosthetic fracture  Prior studies available for comparison: yes       XR Hip With or Without Pelvis 2 - 3 View Right  Result Date: 3/1/2025  Narrative: XR HIP W OR WO PELVIS 2-3 VIEW RIGHT Date of Exam: 3/1/2025 10:20 AM EST Indication: r hip pain Comparison: None available. Findings: No visualized displaced fracture or joint malalignment. Mild to moderate degenerative marginal spurring of both hip joints. 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 Spondylotic change of the lower lumbar spine incompletely assessed. Pelvic phleboliths. Soft tissues unremarkable.     Impression: Impression: No acute osseous findings. Electronically Signed: Parish Lott MD  3/1/2025 10:23 AM EST  Workstation ID: [id number]    Mammo Screening Digital Tomosynthesis Bilateral With CAD  Result Date: 2/14/2025  Narrative: MAMMO SCREENING DIGITAL TOMOSYNTHESIS BILATERAL W CAD-  Date of Exam: 2/14/2025 8:13 AM  Indication: Screening.  Comparison: Prior examinations dating back to 6/21/2016  Technique: Routine screening mammogram images were obtained per protocol.  Tomosynthesis was utilized.  These mammographic images were interpreted with the assistance of a computer aided detection system.  Breast Density: The breast(s) are heterogenously dense, which may obscure small masses.  Findings:  No suspicious masses, suspicious microcalcifications, or architectural distortions are identified.      Impression: No mammographic evidence of malignancy.  Recommend  annual screening mammography.  BI-RADS ASSESSMENT: BI-RADS 2. Benign findings.  Note:  It has been reported that there is approximately a 15% false negative rate in mammography.  Therefore, management of a palpable abnormality should not be deferred because of a negative mammogram.  2/14/2025 8:44 AM by CANDE CALDERON MD on Workstation: [id number]               Assessment and Plan     Diagnoses and all orders for this visit:    1. Right knee pain, unspecified chronicity (Primary)  -     XR Knee 3 View Right    2. History of arthroplasty of right knee      The patient presents here today for an evaluation  of her right knee. X-rays were obtained in the office today and these were reviewed today.     She is overall doing well and not having pain to her right knee.     Advised patient to reach out to her family doctor regarding her back.     She will continue activities as tolerated and over the counter medications for pain control.     Call or return if worsening symptoms.    Follow Up     PRN     Patient was given instructions and counseling regarding her condition or for health maintenance advice. Please see specific information pulled into the AVS if appropriate.     Scribed for Dheeraj Ibarra MD by Sonia Garcia.  03/12/25   15:16 EDT      I have personally performed the services described in this document as scribed by the above individual and it is both accurate and complete. Dheeraj Ibarra MD 03/12/25

## 2025-03-13 ENCOUNTER — OFFICE VISIT (OUTPATIENT)
Dept: FAMILY MEDICINE CLINIC | Facility: CLINIC | Age: 80
End: 2025-03-13
Payer: MEDICARE

## 2025-03-13 ENCOUNTER — HOSPITAL ENCOUNTER (OUTPATIENT)
Dept: GENERAL RADIOLOGY | Facility: HOSPITAL | Age: 80
Discharge: HOME | End: 2025-03-13
Admitting: FAMILY MEDICINE
Payer: MEDICARE

## 2025-03-13 VITALS
WEIGHT: 131 LBS | TEMPERATURE: 98.1 F | HEART RATE: 60 BPM | HEIGHT: 60 IN | DIASTOLIC BLOOD PRESSURE: 74 MMHG | BODY MASS INDEX: 25.72 KG/M2 | RESPIRATION RATE: 16 BRPM | OXYGEN SATURATION: 94 % | SYSTOLIC BLOOD PRESSURE: 139 MMHG

## 2025-03-13 DIAGNOSIS — M47.816 SPONDYLOSIS OF LUMBAR SPINE: Chronic | ICD-10-CM

## 2025-03-13 DIAGNOSIS — M53.3 CHRONIC RIGHT SI JOINT PAIN: Chronic | ICD-10-CM

## 2025-03-13 DIAGNOSIS — G89.29 CHRONIC RIGHT SI JOINT PAIN: Chronic | ICD-10-CM

## 2025-03-13 DIAGNOSIS — G57.01 NEUROPATHY, SCIATIC, RIGHT: Chronic | ICD-10-CM

## 2025-03-13 DIAGNOSIS — E03.9 ACQUIRED HYPOTHYROIDISM: Chronic | ICD-10-CM

## 2025-03-13 DIAGNOSIS — I10 PRIMARY HYPERTENSION: Chronic | ICD-10-CM

## 2025-03-13 DIAGNOSIS — Z78.0 POSTMENOPAUSE: Primary | ICD-10-CM

## 2025-03-13 PROBLEM — F33.1 MAJOR DEPRESSIVE DISORDER, RECURRENT EPISODE, MODERATE DEGREE: Status: RESOLVED | Noted: 2021-11-18 | Resolved: 2025-03-13

## 2025-03-13 PROCEDURE — 72100 X-RAY EXAM L-S SPINE 2/3 VWS: CPT

## 2025-03-13 RX ORDER — ACETAMINOPHEN 500 MG
1000 TABLET ORAL EVERY 6 HOURS PRN
Qty: 60 TABLET | Refills: 0 | Status: SHIPPED | OUTPATIENT
Start: 2025-03-13

## 2025-03-18 NOTE — PROGRESS NOTES
"Chief Complaint  Leg Pain (Pt c/o of pain that goes from Left buttocks down to her knee x2 weeks. /Was recently seen in ER for pain was given toradol shot that did not help. Was seen by Dr. Ibarra- he did xray on knee. )    Subjective          Lena White presents to [facility]  Leg Pain         Patient presents with leg pain going down the left leg buttocks to her knee over 2 weeks no improvement with over-the-counter medicines or exercises at home    Objective   Vital Signs:   /74 (BP Location: Right arm, Patient Position: Sitting, Cuff Size: Adult)   Pulse 60   Temp 98.1 °F (36.7 °C)   Resp 16   Ht 152.4 cm (60\")   Wt 59.4 kg (131 lb)   SpO2 94%   BMI 25.58 kg/m²            Physical Exam  Vitals reviewed.   Constitutional:       Appearance: Normal appearance. She is well-developed.   HENT:      Head: Normocephalic and atraumatic.      Right Ear: External ear normal.      Left Ear: External ear normal.      Nose: Nose normal.   Eyes:      Conjunctiva/sclera: Conjunctivae normal.      Pupils: Pupils are equal, round, and reactive to light.   Cardiovascular:      Rate and Rhythm: Normal rate.   Pulmonary:      Effort: Pulmonary effort is normal.      Breath sounds: Normal breath sounds.   Abdominal:      General: There is no distension.   Musculoskeletal:      Lumbar back: Spasms present.   Skin:     General: Skin is warm and dry.   Neurological:      Mental Status: She is alert and oriented to person, place, and time. Mental status is at baseline.   Psychiatric:         Mood and Affect: Mood and affect normal.         Behavior: Behavior normal.         Thought Content: Thought content normal.         Judgment: Judgment normal.          Result Review :   The following data was reviewed by: Quinn Hussein DO on 03/13/2025:  Common labs          6/12/2024    16:13   Common Labs   Glucose 82    BUN 15    Creatinine 0.60    Sodium 140    Potassium 3.7    Chloride 102  "   Calcium 9.9    Albumin 4.3    Total Bilirubin 0.3    Alkaline Phosphatase 88    AST (SGOT) 14    ALT (SGPT) 11    WBC 10.23    Hemoglobin 14.3    Hematocrit 41.9    Platelets 384    Total Cholesterol 222    Triglycerides 136    HDL Cholesterol 67    LDL Cholesterol  131                    Assessment and Plan    Diagnoses and all orders for this visit:    1. Postmenopause (Primary)  -     DEXA Bone Density Axial; Future    2. Neuropathy, sciatic, right  -     XR Spine Lumbar AP & Lateral; Future  -     MRI Lumbar Spine Without Contrast; Future    3. Spondylosis of lumbar spine  -     XR Spine Lumbar AP & Lateral; Future  -     MRI Lumbar Spine Without Contrast; Future    4. Chronic right SI joint pain  -     XR Spine Lumbar AP & Lateral; Future  -     MRI Lumbar Spine Without Contrast; Future    5. Primary hypertension    6. Acquired hypothyroidism    Other orders  -     acetaminophen (TYLENOL) 500 MG tablet; Take 2 tablets by mouth Every 6 (Six) Hours As Needed for Mild Pain.  Dispense: 60 tablet; Refill: 0  -     tiZANidine (ZANAFLEX) 4 MG tablet; Take 1 tablet by mouth Daily As Needed for Muscle Spasms.  Dispense: 15 tablet; Refill: 0      Medicine prescribed for pain    X-ray ordered to further evaluate    MRI if needed to further evaluate radicular pain and nerve impingement    Seen neurosurgery other specialist physical therapy    Continue medicine for hypothyroidism and hypertension at goal less than 140/90 recheck TSH T4 every 6 months      Follow Up   Return in about 4 weeks (around 4/10/2025), or if symptoms worsen or fail to improve, for Next scheduled follow up, Recheck.  Patient was given instructions and counseling regarding her condition or for health maintenance advice. Please see specific information pulled into the AVS if appropriate.     Transcribed from ambient dictation for Quinn Hussein DO by Quinn Hussein DO.  03/18/25   08:21 EDT

## 2025-04-07 ENCOUNTER — HOSPITAL ENCOUNTER (OUTPATIENT)
Dept: MRI IMAGING | Facility: HOSPITAL | Age: 80
Discharge: HOME | End: 2025-04-07
Admitting: FAMILY MEDICINE
Payer: MEDICARE

## 2025-04-07 DIAGNOSIS — M47.816 SPONDYLOSIS OF LUMBAR SPINE: Chronic | ICD-10-CM

## 2025-04-07 DIAGNOSIS — G89.29 CHRONIC RIGHT SI JOINT PAIN: Chronic | ICD-10-CM

## 2025-04-07 DIAGNOSIS — M53.3 CHRONIC RIGHT SI JOINT PAIN: Chronic | ICD-10-CM

## 2025-04-07 DIAGNOSIS — G57.01 NEUROPATHY, SCIATIC, RIGHT: Chronic | ICD-10-CM

## 2025-04-07 PROCEDURE — 72148 MRI LUMBAR SPINE W/O DYE: CPT

## 2025-04-21 ENCOUNTER — APPOINTMENT (OUTPATIENT)
Dept: MRI IMAGING | Facility: HOSPITAL | Age: 80
End: 2025-04-21
Payer: MEDICARE

## 2025-04-21 ENCOUNTER — HOSPITAL ENCOUNTER (OUTPATIENT)
Dept: BONE DENSITY | Facility: HOSPITAL | Age: 80
Discharge: HOME | End: 2025-04-21
Admitting: FAMILY MEDICINE
Payer: MEDICARE

## 2025-04-21 DIAGNOSIS — Z78.0 POSTMENOPAUSE: ICD-10-CM

## 2025-04-21 PROCEDURE — 77080 DXA BONE DENSITY AXIAL: CPT

## 2025-06-02 DIAGNOSIS — F33.1 MAJOR DEPRESSIVE DISORDER, RECURRENT EPISODE, MODERATE DEGREE: ICD-10-CM

## 2025-06-02 RX ORDER — TRAZODONE HYDROCHLORIDE 100 MG/1
50-100 TABLET ORAL NIGHTLY PRN
Qty: 45 TABLET | Refills: 2 | Status: SHIPPED | OUTPATIENT
Start: 2025-06-02

## 2025-06-26 DIAGNOSIS — E03.9 ACQUIRED HYPOTHYROIDISM: ICD-10-CM

## 2025-06-26 RX ORDER — LEVOTHYROXINE SODIUM 88 UG/1
TABLET ORAL
Qty: 90 TABLET | Refills: 1 | Status: SHIPPED | OUTPATIENT
Start: 2025-06-26

## 2025-06-26 NOTE — TELEPHONE ENCOUNTER
Rx Refill Note  Requested Prescriptions     Pending Prescriptions Disp Refills    levothyroxine (SYNTHROID, LEVOTHROID) 88 MCG tablet [Pharmacy Med Name: LEVOTHYROXINE SODIUM 88 MCG TABLET] 90 tablet 2     Sig: TAKE 1 TABLET BY MOUTH EVERY MORNING TAKE ON EMPTY STOMACH FOR THYROID      Last office visit with prescribing clinician: 3/13/2025   Last telemedicine visit with prescribing clinician: Visit date not found   Next office visit with prescribing clinician: Visit date not found                         Would you like a call back once the refill request has been completed: [] Yes [] No    If the office needs to give you a call back, can they leave a voicemail: [] Yes [] No    Syeda Wilson MA  06/26/25, 10:02 EDT

## 2025-07-11 DIAGNOSIS — F33.1 MAJOR DEPRESSIVE DISORDER, RECURRENT EPISODE, MODERATE DEGREE: ICD-10-CM

## 2025-07-11 NOTE — TELEPHONE ENCOUNTER
Rx Refill Note  Requested Prescriptions     Pending Prescriptions Disp Refills    venlafaxine XR (EFFEXOR-XR) 150 MG 24 hr capsule [Pharmacy Med Name: VENLAFAXINE HYDROCHLORIDEER 150 MG CAPSULE] 90 capsule 2     Sig: TAKE 1 CAPSULE BY MOUTH ONCE DAILY *TAKE WITH 75MG CAPSULE= 225MG      Last office visit with prescribing clinician: 3/13/2025   Last telemedicine visit with prescribing clinician: Visit date not found   Next office visit with prescribing clinician: Visit date not found                         Would you like a call back once the refill request has been completed: [] Yes [] No    If the office needs to give you a call back, can they leave a voicemail: [] Yes [] No    Syeda Wilson MA  07/11/25, 10:33 EDT

## 2025-07-14 RX ORDER — VENLAFAXINE HYDROCHLORIDE 150 MG/1
CAPSULE, EXTENDED RELEASE ORAL
Qty: 90 CAPSULE | Refills: 2 | Status: SHIPPED | OUTPATIENT
Start: 2025-07-14

## 2025-07-21 RX ORDER — VENLAFAXINE HYDROCHLORIDE 75 MG/1
CAPSULE, EXTENDED RELEASE ORAL
Qty: 90 CAPSULE | Refills: 2 | Status: SHIPPED | OUTPATIENT
Start: 2025-07-21

## 2025-08-28 RX ORDER — ALENDRONATE SODIUM 70 MG/1
TABLET ORAL
Qty: 12 TABLET | Refills: 2 | Status: SHIPPED | OUTPATIENT
Start: 2025-08-28

## (undated) DEVICE — INTENDED FOR TISSUE SEPARATION, AND OTHER PROCEDURES THAT REQUIRE A SHARP SURGICAL BLADE TO PUNCTURE OR CUT.: Brand: BARD-PARKER ® CARBON RIB-BACK BLADES

## (undated) DEVICE — SLV SCD LEG COMFORT KENDALLSCD MD REPROC

## (undated) DEVICE — MAJOR-LF: Brand: MEDLINE INDUSTRIES, INC.

## (undated) DEVICE — SUT PERMAHAND SILK 0 FSL 30IN BLK

## (undated) DEVICE — DRSNG SURG AQUACEL AG 9X15CM

## (undated) DEVICE — GOWN,REINFORCE,POLY,SIRUS,BREATH SLV,XLG: Brand: MEDLINE

## (undated) DEVICE — SUT VIC 3/0 SH 27IN J416H

## (undated) DEVICE — STRIP CLS WND SUTURESTRIP/PLS 0.5X4IN TP1103

## (undated) DEVICE — ELECTRD BLD EDGE/INSUL1P 2.4X5.1MM STRL

## (undated) DEVICE — STERILE POLYISOPRENE POWDER-FREE SURGICAL GLOVES WITH EMOLLIENT COATING: Brand: PROTEXIS

## (undated) DEVICE — PENCL E/S SMOKEEVAC W/TELESCP CANN

## (undated) DEVICE — ADHS LIQ MASTISOL 2/3ML